# Patient Record
Sex: MALE | Race: BLACK OR AFRICAN AMERICAN | NOT HISPANIC OR LATINO | Employment: FULL TIME | URBAN - NONMETROPOLITAN AREA
[De-identification: names, ages, dates, MRNs, and addresses within clinical notes are randomized per-mention and may not be internally consistent; named-entity substitution may affect disease eponyms.]

---
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
## 2018-03-11 NOTE — ED PROVIDER NOTES
History  Chief Complaint   Patient presents with    Shortness of Breath     short of breathe  for 2days-history of asthma     HPI     21 yoM with mild asthma left his inhaler in another town while he's visiting family  SOb for two days  Never intubated  Never admitted  No steroid inhalers needed  Weekly nighttime awakening  No cp or fevers  Usually seasonal change sets hiim off  MDM:  Imp: asthma exac, he warrants albuterol and safe DC  Prior to Admission Medications   Prescriptions Last Dose Informant Patient Reported? Taking? albuterol (PROVENTIL HFA,VENTOLIN HFA) 90 mcg/act inhaler   Yes Yes   Sig: Inhale 2 puffs every 6 (six) hours as needed for wheezing      Facility-Administered Medications: None       Past Medical History:   Diagnosis Date    Asthma        History reviewed  No pertinent surgical history  History reviewed  No pertinent family history  I have reviewed and agree with the history as documented  Social History   Substance Use Topics    Smoking status: Never Smoker    Smokeless tobacco: Never Used    Alcohol use No        Review of Systems   Constitutional: Negative  Negative for appetite change, diaphoresis, fatigue and fever  HENT: Negative for congestion, dental problem, drooling, ear discharge, ear pain, postnasal drip, rhinorrhea, sore throat, trouble swallowing and voice change  Eyes: Negative for photophobia, pain, discharge, redness and visual disturbance  Respiratory: Positive for cough and shortness of breath  Negative for choking, chest tightness, wheezing and stridor  Cardiovascular: Negative for chest pain, palpitations and leg swelling  Gastrointestinal: Negative for abdominal pain, blood in stool, constipation, diarrhea, nausea and vomiting  Endocrine: Negative  Genitourinary: Negative  Musculoskeletal: Negative  Skin: Negative  Allergic/Immunologic: Negative  Neurological: Negative  Hematological: Negative  Psychiatric/Behavioral: Negative  Physical Exam  ED Triage Vitals [11/12/17 1317]   Temperature Pulse Respirations Blood Pressure SpO2   97 5 °F (36 4 °C) 102 22 142/87 96 %      Temp Source Heart Rate Source Patient Position - Orthostatic VS BP Location FiO2 (%)   Temporal -- -- Left arm --      Pain Score       7           Orthostatic Vital Signs  Vitals:    11/12/17 1317   BP: 142/87   Pulse: 102       Physical Exam   Constitutional: He is oriented to person, place, and time  He appears well-developed and well-nourished  No distress  HENT:   Head: Normocephalic and atraumatic  Nose: Nose normal    Mouth/Throat: Oropharynx is clear and moist    Eyes: Conjunctivae and EOM are normal  Pupils are equal, round, and reactive to light  Neck: Normal range of motion  Neck supple  No thyromegaly present  Cardiovascular: Normal rate, regular rhythm, normal heart sounds and intact distal pulses  Exam reveals no gallop and no friction rub  No murmur heard  Pulmonary/Chest: Effort normal  No stridor  No respiratory distress  He has wheezes (mild)  He has no rales  He exhibits no tenderness  Abdominal: Soft  Bowel sounds are normal  There is no tenderness  There is no guarding  Musculoskeletal: Normal range of motion  He exhibits no deformity  Neurological: He is alert and oriented to person, place, and time  He exhibits normal muscle tone  Skin: Skin is warm and dry  Psychiatric: He has a normal mood and affect  Vitals reviewed  ED Medications  Medications   albuterol (PROVENTIL HFA,VENTOLIN HFA) inhaler 2 puff (2 puffs Inhalation Given 11/12/17 1325)       Diagnostic Studies  Results Reviewed     None                 No orders to display              Procedures  Procedures       Phone Contacts  ED Phone Contact    ED Course  ED Course as of Nov 13 0729   Sun Nov 12, 2017   1337 Improved BS after inhaler used                                  [facility]  CritCare Time    Disposition  Final diagnoses:   Asthma exacerbation     Time reflects when diagnosis was documented in both MDM as applicable and the Disposition within this note     Time User Action Codes Description Comment    11/12/2017  1:22 PM Dhruv Garcia Add [J45 [id number]] Asthma exacerbation       ED Disposition     ED Disposition Condition Comment    Discharge  Kellie Alt discharge to home/self care  Condition at discharge: Good        Follow-up Information     Follow up With Specialties Details Why Contact Info    your primary care physician  Schedule an appointment as soon as possible for a visit for re-evaluation         Discharge Medication List as of 11/12/2017  1:22 PM      CONTINUE these medications which have NOT CHANGED    Details   albuterol (PROVENTIL HFA,VENTOLIN HFA) 90 mcg/act inhaler Inhale 2 puffs every 6 (six) hours as needed for wheezing, Historical Med           No discharge procedures on file      ED Provider  Electronically Signed by           Dhruv Mcleod DO  11/13/17 4319 ambulatory

## 2018-07-27 ENCOUNTER — APPOINTMENT (EMERGENCY)
Dept: RADIOLOGY | Facility: HOSPITAL | Age: 18
End: 2018-07-27
Payer: COMMERCIAL

## 2018-07-27 ENCOUNTER — HOSPITAL ENCOUNTER (EMERGENCY)
Facility: HOSPITAL | Age: 18
Discharge: HOME | End: 2018-07-27
Attending: EMERGENCY MEDICINE | Admitting: EMERGENCY MEDICINE
Payer: COMMERCIAL

## 2018-07-27 VITALS
SYSTOLIC BLOOD PRESSURE: 121 MMHG | WEIGHT: 126.32 LBS | BODY MASS INDEX: 18.65 KG/M2 | RESPIRATION RATE: 18 BRPM | TEMPERATURE: 98.7 F | HEART RATE: 58 BPM | OXYGEN SATURATION: 100 % | DIASTOLIC BLOOD PRESSURE: 58 MMHG

## 2018-07-27 DIAGNOSIS — S83.91XA RIGHT KNEE SPRAIN: Primary | ICD-10-CM

## 2018-07-27 PROCEDURE — 73564 X-RAY EXAM KNEE 4 OR MORE: CPT

## 2018-07-27 PROCEDURE — 99283 EMERGENCY DEPT VISIT LOW MDM: CPT

## 2018-07-27 RX ORDER — NAPROXEN 250 MG/1
500 TABLET ORAL ONCE
Status: DISCONTINUED | OUTPATIENT
Start: 2018-07-27 | End: 2018-07-27

## 2018-07-27 RX ORDER — NAPROXEN 500 MG/1
500 TABLET ORAL 2 TIMES DAILY WITH MEALS
Qty: 14 TABLET | Refills: 0 | Status: SHIPPED | OUTPATIENT
Start: 2018-07-27 | End: 2018-08-03

## 2018-07-27 RX ORDER — IBUPROFEN 400 MG/1
400 TABLET ORAL ONCE
Status: COMPLETED | OUTPATIENT
Start: 2018-07-27 | End: 2018-07-27

## 2018-07-27 RX ADMIN — IBUPROFEN 400 MG: 400 TABLET, FILM COATED ORAL at 14:44

## 2018-07-27 NOTE — DISCHARGE INSTRUCTIONS
Knee Sprain   WHAT YOU NEED TO KNOW:   A knee sprain occurs when one or more ligaments in your knee are suddenly stretched or torn  Ligaments are tissues that hold bones together  Ligaments support the knee and keep the joint and bones in the correct position  DISCHARGE INSTRUCTIONS:   Return to the emergency department if:   · Any part of your leg feels cold, numb, or looks pale     Contact your healthcare provider if:   · You have new or increased swelling, bruising, or pain in your knee  · Your symptoms do not improve within 6 weeks, even with treatment  · You have questions or concerns about your condition or care  Medicines:   · NSAIDs , such as ibuprofen, help decrease swelling, pain, and fever  This medicine is available with or without a doctor's order  NSAIDs can cause stomach bleeding or kidney problems in certain people  If you take blood thinner medicine, always ask your healthcare provider if NSAIDs are safe for you  Always read the medicine label and follow directions  · Acetaminophen  decreases pain and fever  It is available without a doctor's order  Ask how much to take and how often to take it  Follow directions  Read the labels of all other medicines you are using to see if they also contain acetaminophen, or ask your doctor or pharmacist  Acetaminophen can cause liver damage if not taken correctly  Do not use more than 4 grams (4,000 milligrams) total of acetaminophen in one day  · Prescription pain medicine  may be given  Ask how to take this medicine safely  · Take your medicine as directed  Contact your healthcare provider if you think your medicine is not helping or if you have side effects  Tell him or her if you are allergic to any medicine  Keep a list of the medicines, vitamins, and herbs you take  Include the amounts, and when and why you take them  Bring the list or the pill bottles to follow-up visits   Carry your medicine list with you in case of an emergency  Self-care:   · Rest  your knee and do not exercise  You may be told to keep weight off your knee  This means that you should not walk on your injured leg  Rest helps decrease swelling and allows the injury to heal  You can do gentle range of motion (ROM) exercises as directed  This will prevent stiffness  · Apply ice  on your knee for 15 to 20 minutes every hour or as directed  Use an ice pack, or put crushed ice in a plastic bag  Cover it with a towel  Ice helps prevent tissue damage and decreases swelling and pain  · Apply compression to your knee as directed  You may need to wear an elastic bandage  This helps keep your injured knee from moving too much while it heals  You can loosen or tighten the elastic bandage to make it comfortable  It should be tight enough for you to feel support  It should not be so tight that it causes your toes to feel numb or tingly  If you are wearing an elastic bandage, take it off and rewrap it once a day  · Elevate your knee  above the level of your heart as often as you can  This will help decrease swelling and pain  Prop your leg on pillows or blankets to keep it elevated comfortably  Do not put pillows directly behind your knee  · Use support devices as directed:  Support devices such as a splint or brace may be needed  These devices limit movement and protect your joint while it heals  You may be given crutches to use until you can stand on your injured leg without pain  Use devices as directed  Physical therapy:  A physical therapist teaches you exercises to help improve movement and strength, and to decrease pain  Prevent another knee sprain:  Exercise your legs to keep your muscles strong  Strong leg muscles help protect your knee and prevent strain  The following may also prevent a knee sprain:  · Slowly start your exercise or training program   Slowly increase the time, distance, and intensity of your exercise   Sudden increases in training may cause you to injure your knee again  · Wear protective braces and equipment as directed  Braces may prevent your knee from moving the wrong way and causing another sprain  Protective equipment may support your bones and ligaments to prevent injury  · Warm up and stretch before exercise  Warm up by walking or using an exercise bike before starting your regular exercise  Do gentle stretches after warming up  This helps to loosen your muscles and decrease stress on your knee  Cool down and stretch after you exercise  · Wear shoes that fit correctly and support your feet  Replace your running or exercise shoes before the padding or shock absorption is worn out  Ask your healthcare provider which exercise shoes are best for you  Ask if you should wear special shoe inserts  Shoe inserts can help support your heels and arches or keep your foot lined up correctly in your shoes  Exercise on flat surfaces  Follow up with your healthcare provider as directed:  Write down your questions so you remember to ask them during your visits  © 2017 [facility] Information is for End User's use only and may not be sold, redistributed or otherwise used for commercial purposes  All illustrations and images included in CareNotes® are the copyrighted property of [employer]  or [employer]  The above information is an  only  It is not intended as medical advice for individual conditions or treatments  Talk to your doctor, nurse or pharmacist before following any medical regimen to see if it is safe and effective for you

## 2018-07-28 NOTE — ED PROVIDER NOTES
History  Chief Complaint   Patient presents with    Knee Injury     pt was playing basketball when he fell onto his right knee  History provided by:  Patient  Knee Pain   Location:  Knee  Time since incident:  1 week  Injury: yes    Mechanism of injury: fall    Fall:     Fall occurred:  Recreating/playing (fell playing basketball)    Impact surface:  Athletic surface    Point of impact:  Knees    Entrapped after fall: no    Knee location:  R knee  Pain details:     Quality:  Unable to specify    Radiates to:  Does not radiate    Severity:  Moderate    Onset quality:  Sudden    Timing:  Constant    Progression:  Unchanged  Chronicity:  New  Dislocation: no    Foreign body present:  No foreign bodies  Tetanus status:  Up to date  Prior injury to area:  No  Relieved by:  Nothing  Worsened by:  Nothing  Ineffective treatments:  Ice and rest  Associated symptoms: no back pain, no decreased ROM, no fatigue, no fever, no muscle weakness, no neck pain, no numbness, no stiffness, no swelling (subjectively appears swollen "bone sticking out" on right lateral knee) and no tingling    Risk factors: no concern for non-accidental trauma, no frequent fractures, no known bone disorder, no obesity and no recent illness        Prior to Admission Medications   Prescriptions Last Dose Informant Patient Reported? Taking?    albuterol (PROVENTIL HFA,VENTOLIN HFA) 90 mcg/act inhaler   Yes No   Sig: Inhale 2 puffs every 6 (six) hours as needed for wheezing   albuterol (PROVENTIL HFA,VENTOLIN HFA) 90 mcg/act inhaler   No No   Sig: Inhale 2 puffs every 6 (six) hours as needed for wheezing   fluticasone-salmeterol (ADVAIR HFA) 115-21 MCG/ACT inhaler   No No   Sig: Inhale 2 puffs 2 (two) times a day   ipratropium-albuterol (DUO-NEB) 0 5-2 5 mg/mL   No No   Sig: Take 3 mL by nebulization every 6 (six) hours   predniSONE 20 mg tablet   No No   Sig: Take 3 tablets by mouth daily      Facility-Administered Medications: None       Past Medical History:   Diagnosis Date    Asthma        History reviewed  No pertinent surgical history  History reviewed  No pertinent family history  I have reviewed and agree with the history as documented  Social History   Substance Use Topics    Smoking status: Current Some Day Smoker    Smokeless tobacco: Never Used    Alcohol use No        Review of Systems   Constitutional: Negative for chills, fatigue and fever  HENT: Negative for congestion, ear pain and sore throat  Respiratory: Negative for cough and shortness of breath  Cardiovascular: Negative for chest pain and palpitations  Gastrointestinal: Negative for abdominal pain, diarrhea, nausea and vomiting  Musculoskeletal: Negative for arthralgias, back pain, neck pain and stiffness  Skin: Negative for rash and wound  Allergic/Immunologic: Negative for immunocompromised state  Neurological: Negative for dizziness, numbness and headaches  Physical Exam  Physical Exam   Constitutional: He is oriented to person, place, and time  He appears well-developed and well-nourished  HENT:   Head: Normocephalic and atraumatic  Mouth/Throat: Oropharynx is clear and moist    Eyes: Pupils are equal, round, and reactive to light  Neck: Neck supple  Cardiovascular: Normal rate, regular rhythm, normal heart sounds and intact distal pulses  Pulses:       Dorsalis pedis pulses are 2+ on the right side, and 2+ on the left side  Posterior tibial pulses are 2+ on the right side, and 2+ on the left side  Pulmonary/Chest: Effort normal and breath sounds normal    Musculoskeletal: Normal range of motion  He exhibits tenderness  He exhibits no edema or deformity          Right hip: Normal         Right knee: He exhibits normal range of motion, no swelling, no effusion, no ecchymosis, no deformity, no laceration, no erythema, normal alignment, no LCL laxity, normal patellar mobility, no bony tenderness, normal meniscus and no MCL laxity  Tenderness found  Lateral joint line and LCL tenderness noted  No medial joint line, no MCL and no patellar tendon tenderness noted  Left knee: He exhibits normal range of motion, no swelling, no effusion, no ecchymosis, no deformity, no laceration, no erythema, normal alignment, no LCL laxity, normal patellar mobility, no bony tenderness, normal meniscus and no MCL laxity  No tenderness found  No medial joint line, no lateral joint line, no MCL, no LCL and no patellar tendon tenderness noted  Right ankle: Normal         Left ankle: Normal         Legs:  Neurological: He is alert and oriented to person, place, and time  Skin: Skin is warm and dry  Capillary refill takes less than 2 seconds  Nursing note and vitals reviewed  Vital Signs  ED Triage Vitals [07/27/18 1440]   Temperature Pulse Respirations Blood Pressure SpO2   98 7 °F (37 1 °C) 58 18 121/58 100 %      Temp Source Heart Rate Source Patient Position - Orthostatic VS BP Location FiO2 (%)   Temporal Monitor Sitting Right arm --      Pain Score       Worst Possible Pain           Vitals:    07/27/18 1440   BP: 121/58   Pulse: 58   Patient Position - Orthostatic VS: Sitting       Visual Acuity      ED Medications  Medications   ibuprofen (MOTRIN) tablet 400 mg (400 mg Oral Given 7/27/18 1444)       Diagnostic Studies  Results Reviewed     None                 XR knee 4+ views RIGHT   Final Result by Sammy Hammond MD (07/27 1514)      Normal right knee  Workstation performed: [id number]                    Procedures  Procedures   Splint (knee immobilizer) applied by RN and/or ED Tech  CMS intact checked by me pre and post splint application        Phone Contacts  ED Phone Contact    ED Course                               MDM  Number of Diagnoses or Management Options  Right knee sprain: new and does not require workup     Amount and/or Complexity of Data Reviewed  Tests in the radiology section of CPT®: ordered and reviewed  Independent visualization of images, tracings, or specimens: yes    Risk of Complications, Morbidity, and/or Mortality  Presenting problems: low  Diagnostic procedures: low  Management options: low    Patient Progress  Patient progress: stable    CritCare Time    Disposition  Final diagnoses:   Right knee sprain     Time reflects when diagnosis was documented in both MDM as applicable and the Disposition within this note     Time User Action Codes Description Comment    7/27/2018  3:49 PM Chace Mejia Right knee sprain       ED Disposition     ED Disposition Condition Comment    Discharge  Lazarus Hurter discharge to home/self care  Condition at discharge: Good        Follow-up Information     Follow up With Specialties Details Why Contact Info    Allison Hodges MD Orthopedic Surgery Schedule an appointment as soon as possible for a visit Seen in ER need followup for injury, orthopedic followup 2018 [address], [place] [address]  [phone]            Discharge Medication List as of 7/27/2018  3:50 PM      START taking these medications    Details   naproxen (NAPROSYN) 500 mg tablet Take 1 tablet (500 mg total) by mouth 2 (two) times a day with meals for 7 days, Starting Fri 7/27/2018, Until Fri 8/3/2018, Print         CONTINUE these medications which have NOT CHANGED    Details   !! albuterol (PROVENTIL HFA,VENTOLIN HFA) 90 mcg/act inhaler Inhale 2 puffs every 6 (six) hours as needed for wheezing, Historical Med      !! albuterol (PROVENTIL HFA,VENTOLIN HFA) 90 mcg/act inhaler Inhale 2 puffs every 6 (six) hours as needed for wheezing, Starting Mon 11/27/2017, Print      fluticasone-salmeterol (ADVAIR HFA) 115-21 MCG/ACT inhaler Inhale 2 puffs 2 (two) times a day, Starting Mon 11/27/2017, Print      ipratropium-albuterol (DUO-NEB) 0 5-2 5 mg/mL Take 3 mL by nebulization every 6 (six) hours, Starting Mon 11/27/2017, Print       !! - Potential duplicate medications found   Please discuss with provider  STOP taking these medications       predniSONE 20 mg tablet Comments:   Reason for Stopping:             No discharge procedures on file      ED Provider  Electronically Signed by           Arjun Marks PA-C  07/27/18 5715

## 2019-06-28 ENCOUNTER — APPOINTMENT (EMERGENCY)
Dept: RADIOLOGY | Facility: HOSPITAL | Age: 19
End: 2019-06-28
Payer: COMMERCIAL

## 2019-06-28 ENCOUNTER — HOSPITAL ENCOUNTER (EMERGENCY)
Facility: HOSPITAL | Age: 19
Discharge: HOME | End: 2019-06-28
Attending: EMERGENCY MEDICINE | Admitting: EMERGENCY MEDICINE
Payer: COMMERCIAL

## 2019-06-28 VITALS
TEMPERATURE: 98.5 F | DIASTOLIC BLOOD PRESSURE: 59 MMHG | SYSTOLIC BLOOD PRESSURE: 111 MMHG | OXYGEN SATURATION: 98 % | WEIGHT: 126.32 LBS | HEART RATE: 63 BPM | RESPIRATION RATE: 18 BRPM | BODY MASS INDEX: 18.65 KG/M2

## 2019-06-28 DIAGNOSIS — M71.30: ICD-10-CM

## 2019-06-28 DIAGNOSIS — M25.571 RIGHT ANKLE PAIN: Primary | ICD-10-CM

## 2019-06-28 PROCEDURE — 73590 X-RAY EXAM OF LOWER LEG: CPT

## 2019-06-28 PROCEDURE — 73610 X-RAY EXAM OF ANKLE: CPT

## 2019-06-28 PROCEDURE — 99283 EMERGENCY DEPT VISIT LOW MDM: CPT | Performed by: EMERGENCY MEDICINE

## 2019-06-28 PROCEDURE — 99283 EMERGENCY DEPT VISIT LOW MDM: CPT

## 2019-06-28 PROCEDURE — 73564 X-RAY EXAM KNEE 4 OR MORE: CPT

## 2019-06-28 RX ORDER — NAPROXEN 375 MG/1
375 TABLET ORAL 2 TIMES DAILY WITH MEALS
Qty: 20 TABLET | Refills: 0 | Status: SHIPPED | OUTPATIENT
Start: 2019-06-28 | End: 2020-09-17

## 2019-06-28 RX ORDER — NAPROXEN 250 MG/1
375 TABLET ORAL ONCE
Status: COMPLETED | OUTPATIENT
Start: 2019-06-28 | End: 2019-06-28

## 2019-06-28 RX ADMIN — NAPROXEN 375 MG: 250 TABLET ORAL at 18:16

## 2019-06-28 NOTE — ED NOTES
Ave wrap applied to right knee, pt ambulated with air cast refused crutches     Arianna Alamo, FREDERICK  06/28/19 6923

## 2019-06-28 NOTE — ED PROVIDER NOTES
History  Chief Complaint   Patient presents with    Ankle Pain     Patient was playing basketball one hour ago and rolled ankle  Patient would like a note for work  History provided by:  Patient  Ankle Pain   Location:  Ankle  Time since incident:  30 minutes  Injury: yes    Mechanism of injury comment:  Patient was playing basketball and lost balance while moving with right ankle inverting sharply  Ankle location:  R ankle  Pain details:     Quality:  Aching    Radiates to:  Does not radiate    Severity:  Moderate    Onset quality:  Sudden    Duration: 30 minutes  Timing:  Constant    Progression:  Unchanged  Chronicity:  New (No history of fracture/surgery in either LE: has had multiple ankle sprains of both lower extremities previously without any long-term deficits in use)  Prior injury to area:  No  Relieved by:  Nothing  Worsened by:  Bearing weight, extension and flexion (Has been able to bear weight minimally on ankle since the injury)  Ineffective treatments:  None tried  Associated symptoms: decreased ROM (Pain with any attempted AROM at the ankle), stiffness and swelling    Associated symptoms: no fatigue, no fever, no numbness and no tingling    Associated symptoms comment:  Occurring simultaneously with the injury, the patient has noted an area of soft tissue prominence over the right lateral tibial plateau that is painful with direct palpation  It is less prominent with extension of the knee  He states there had been no swelling or pain in that area prior to the injury  No pain in the remainder of the fibula or remainder of knee  Risk factors comment:  Denies any other trauma or injury from this episode: no head injury/LOC      Prior to Admission Medications   Prescriptions Last Dose Informant Patient Reported? Taking?    albuterol (PROVENTIL HFA,VENTOLIN HFA) 90 mcg/act inhaler   Yes Yes   Sig: Inhale 2 puffs every 6 (six) hours as needed for wheezing   albuterol (PROVENTIL HFA,VENTOLIN HFA) 90 mcg/act inhaler   No No   Sig: Inhale 2 puffs every 6 (six) hours as needed for wheezing   fluticasone-salmeterol (ADVAIR HFA) 115-21 MCG/ACT inhaler   No Yes   Sig: Inhale 2 puffs 2 (two) times a day   ipratropium-albuterol (DUO-NEB) 0 5-2 5 mg/mL   No Yes   Sig: Take 3 mL by nebulization every 6 (six) hours   naproxen (NAPROSYN) 500 mg tablet   No No   Sig: Take 1 tablet (500 mg total) by mouth 2 (two) times a day with meals for 7 days      Facility-Administered Medications: None       Past Medical History:   Diagnosis Date    Asthma        History reviewed  No pertinent surgical history  History reviewed  No pertinent family history  I have reviewed and agree with the history as documented  Social History     Tobacco Use    Smoking status: Current Some Day Smoker     Types: E-Cigarettes    Smokeless tobacco: Never Used   Substance Use Topics    Alcohol use: No    Drug use: No        Review of Systems   Constitutional: Negative for chills, diaphoresis, fatigue and fever  Musculoskeletal: Positive for arthralgias, gait problem, joint swelling, myalgias and stiffness  Skin: Negative for color change, pallor, rash and wound  Neurological: Negative for weakness and numbness  Hematological: Negative for adenopathy  Does not bruise/bleed easily  Physical Exam  Physical Exam   Constitutional: He is oriented to person, place, and time  Vital signs are normal  He appears well-developed and well-nourished  He is active and cooperative  No distress  HENT:   Head: Normocephalic and atraumatic  Neck: Trachea normal and phonation normal    Cardiovascular: Normal rate, regular rhythm, intact distal pulses and normal pulses  Pulses:       Popliteal pulses are 2+ on the right side, and 2+ on the left side  Dorsalis pedis pulses are 2+ on the right side, and 2+ on the left side  Posterior tibial pulses are 2+ on the right side, and 2+ on the left side     Pulmonary/Chest: Effort normal  No respiratory distress  Musculoskeletal:        Right knee: He exhibits bony tenderness  He exhibits normal range of motion (Full AROM in flexion/extension), no swelling and no effusion  Tenderness found  Lateral joint line tenderness noted  No medial joint line, no MCL and no LCL tenderness noted  Left knee: Normal         Right ankle: He exhibits decreased range of motion  He exhibits no swelling and no ecchymosis  Tenderness  Lateral malleolus, AITFL and proximal fibula tenderness found  No posterior TFL and no head of 5th metatarsal tenderness found  Achilles tendon exhibits no pain, no defect and normal Park's test results  Left ankle: Normal         Right lower leg: Normal         Left lower leg: Normal         Legs:       Right foot: Normal         Left foot: Normal         Feet:    Right ankle:   Mild soft tissue swelling immediately inferior/anterior to the lateral malleolus with no palpable bony deformity and no overlying skin breakdown  TTP over lateral malleolus and immediately inferior/anterior in region of ATF ligament  No ttp of proximal 5th MT  Full AROM in flexion/extension/inversion/eversion at ankle  Strength 5/5 in LE bilaterally at knee/ankle/toes in flexion/extension  Anterior drawer test wnl bilaterally  Right knee: There is a soft tissue prominence approx 1 cm diameter over the lateral tibial plateau distinct from the fibular head; there is mild ttp of this region without crepitus/bony deformity  Anterior drawer/posterior drawer and varus/valgus stress tests wnl  Left knee: Anterior drawer/posterior drawer and varus/valgus stress tests wnl  Neurological: He is alert and oriented to person, place, and time  He has normal strength  No sensory deficit  GCS eye subscore is 4  GCS verbal subscore is 5  GCS motor subscore is 6  Sensation is intact to light touch throughout the L2-S2 distribution of bilateral lower extremity    Strength 5/5 bilateral lower extremity at hip/knee/ankle in all planes of motion  Skin: Skin is warm, dry and intact  Capillary refill takes less than 2 seconds  He is not diaphoretic  Nursing note and vitals reviewed  Vital Signs  ED Triage Vitals [06/28/19 1755]   Temperature Pulse Respirations Blood Pressure SpO2   98 5 °F (36 9 °C) 56 17 114/62 100 %      Temp Source Heart Rate Source Patient Position - Orthostatic VS BP Location FiO2 (%)   Temporal Monitor Sitting Right arm --      Pain Score       6           Vitals:    06/28/19 1755 06/28/19 1830   BP: 114/62 105/57   Pulse: 56 56   Patient Position - Orthostatic VS: Sitting Lying         Visual Acuity      ED Medications  Medications   naproxen (NAPROSYN) tablet 375 mg (375 mg Oral Given 6/28/19 1816)       Diagnostic Studies  Results Reviewed     None                 XR knee 4+ vw right injury   ED Interpretation by Shaheen Burgos DO (06/28 1851)   No evidence of fracture/dislocation  The soft tissue prominence noted on physical examination does not correspond with any bony structures  Final Result by Cindy Leroy MD (06/28 1918)      No acute osseous abnormality  Workstation performed: [id number]         XR tibia fibula 2 views RIGHT   ED Interpretation by Shaheen Burgos DO (06/28 1852)   No evidence of fracture/dislocation  Final Result by Cindy Leroy MD (06/28 1920)      No acute osseous abnormality  Workstation performed: [id number]         XR ankle 3+ views RIGHT   ED Interpretation by Shaheen Burgos DO (06/28 1852)   No evidence of fracture/dislocation    Joint spaces appear normal                  Procedures  Procedures       ED Course         MDM  Number of Diagnoses or Management Options  Cyst of bursa of right knee: new and does not require workup  Right ankle pain: new and does not require workup     Amount and/or Complexity of Data Reviewed  Tests in the radiology section of CPT®: ordered and reviewed  Decide to obtain previous medical records or to obtain history from someone other than the patient: yes  Review and summarize past medical records: yes  Independent visualization of images, tracings, or specimens: yes    Risk of Complications, Morbidity, and/or Mortality  Presenting problems: moderate  Diagnostic procedures: moderate  Management options: moderate  General comments: No radiographic evidence of acute abnormality and no evidence of neurovascular injury/compartment syndrome  Suspect there is a combination of 1) moderate ankle sprain and 2) bursal herniation/cyst of R knee  Apply R knee Ace wrap and air cast to R ankle; WBAT in RLE  NSAID for pain control  Short-term f/u with orthopedic surgery for further evaluation  Discussed the impression with patient and the plan of care; all questions answered prior to discharge  Patient expressed understanding and agreed to plan  Patient Progress  Patient progress: improved      Disposition  Final diagnoses:   Right ankle pain   Cyst of bursa of right knee     Time reflects when diagnosis was documented in both MDM as applicable and the Disposition within this note     Time User Action Codes Description Comment    6/28/2019  6:57 PM Jose Olmstead [M25 571] Right ankle pain     6/28/2019  6:58 PM Jose Rose Add [M71 30] Cyst of bursa     6/28/2019  6:58 PM Emili Perdomo 1521 [M71 30] Cyst of bursa of right knee       ED Disposition     ED Disposition Condition Date/Time Comment    Discharge Stable Fri Jun 28, 2019  6:57 PM Muriel Durand discharge to home/self care              Follow-up Information     Follow up With Specialties Details Why [address] [facility]  Call in 2 days For an appointment for further evaluation [address] [facility]  [phone]          Patient's Medications   Discharge Prescriptions    NAPROXEN (NAPROSYN) 375 MG TABLET    Take 1 tablet (375 mg total) by mouth 2 (two) times a day with meals       Start Date: 6/28/2019 End Date: --       Order Dose: 375 mg       Quantity: 20 tablet    Refills: 0     No discharge procedures on file      ED Provider  Electronically Signed by           Anastacio Piper DO  06/28/19 2015

## 2019-07-29 ENCOUNTER — HOSPITAL ENCOUNTER (EMERGENCY)
Facility: HOSPITAL | Age: 19
Discharge: HOME | End: 2019-07-29
Attending: EMERGENCY MEDICINE | Admitting: EMERGENCY MEDICINE
Payer: COMMERCIAL

## 2019-07-29 VITALS
DIASTOLIC BLOOD PRESSURE: 81 MMHG | RESPIRATION RATE: 18 BRPM | OXYGEN SATURATION: 100 % | HEIGHT: 70 IN | TEMPERATURE: 98.4 F | WEIGHT: 128.09 LBS | BODY MASS INDEX: 18.34 KG/M2 | HEART RATE: 74 BPM | SYSTOLIC BLOOD PRESSURE: 116 MMHG

## 2019-07-29 DIAGNOSIS — J45.901 ASTHMA EXACERBATION: Primary | ICD-10-CM

## 2019-07-29 PROCEDURE — 94640 AIRWAY INHALATION TREATMENT: CPT

## 2019-07-29 PROCEDURE — 99284 EMERGENCY DEPT VISIT MOD MDM: CPT

## 2019-07-29 PROCEDURE — 99283 EMERGENCY DEPT VISIT LOW MDM: CPT | Performed by: EMERGENCY MEDICINE

## 2019-07-29 RX ORDER — PREDNISONE 20 MG/1
60 TABLET ORAL ONCE
Status: COMPLETED | OUTPATIENT
Start: 2019-07-29 | End: 2019-07-29

## 2019-07-29 RX ORDER — ALBUTEROL SULFATE 2.5 MG/3ML
5 SOLUTION RESPIRATORY (INHALATION) ONCE
Status: COMPLETED | OUTPATIENT
Start: 2019-07-29 | End: 2019-07-29

## 2019-07-29 RX ORDER — LORATADINE 10 MG/1
10 TABLET ORAL DAILY
Qty: 30 TABLET | Refills: 0 | Status: SHIPPED | OUTPATIENT
Start: 2019-07-29 | End: 2020-09-17

## 2019-07-29 RX ORDER — PREDNISONE 20 MG/1
60 TABLET ORAL DAILY
Qty: 15 TABLET | Refills: 0 | Status: SHIPPED | OUTPATIENT
Start: 2019-07-29 | End: 2019-08-03

## 2019-07-29 RX ORDER — LORATADINE 10 MG/1
10 TABLET ORAL ONCE
Status: COMPLETED | OUTPATIENT
Start: 2019-07-29 | End: 2019-07-29

## 2019-07-29 RX ORDER — ALBUTEROL SULFATE 90 UG/1
2 AEROSOL, METERED RESPIRATORY (INHALATION) EVERY 4 HOURS PRN
Qty: 1 INHALER | Refills: 0 | Status: SHIPPED | OUTPATIENT
Start: 2019-07-29 | End: 2020-09-17

## 2019-07-29 RX ADMIN — PREDNISONE 60 MG: 20 TABLET ORAL at 11:43

## 2019-07-29 RX ADMIN — LORATADINE 10 MG: 10 TABLET ORAL at 11:43

## 2019-07-29 RX ADMIN — ALBUTEROL SULFATE 5 MG: 2.5 SOLUTION RESPIRATORY (INHALATION) at 11:43

## 2019-07-29 RX ADMIN — IPRATROPIUM BROMIDE 0.5 MG: 0.5 SOLUTION RESPIRATORY (INHALATION) at 11:43

## 2019-07-29 NOTE — ED PROVIDER NOTES
History  Chief Complaint   Patient presents with    Shortness of Breath     worse last night  ran out of all his meds     Patient is a 57-year-old male who presents today with a chief complaint of asthma exacerbation  Patient reports that last evening he had an asthma exacerbation which he reports he felt was brought on by seasonal allergies  Patient reports he does not take any medications for seasonal allergies  Patient reports he does not believe he has ever been intubated for his asthma however reports he was previously admitted to the hospital for asthma with the last admission being in 2016  Patient reports no fevers or chills, nasal congestion associated with his asthma exacerbation  Patient reports he did not take any medications as he has ran out of his medications and does not follow with a family care provider on a regular basis  Patient reports he was concerned to go to work today as he works in a hot environment making windmills  History provided by:  Patient   used: No    Wheezing   Severity:  Mild  Severity compared to prior episodes:  Similar  Onset quality:  Gradual  Duration:  1 day  Timing:  Constant  Progression:  Partially resolved  Chronicity:  Chronic  Context comment:  Seasonal changes  Relieved by:  Beta-agonist inhaler  Worsened by:  Nothing  Ineffective treatments:  None tried  Associated symptoms: cough ( dry) and shortness of breath ( mild, resolved )    Associated symptoms: no chest pain, no chest tightness, no ear pain, no fatigue, no fever, no rash, no rhinorrhea and no sore throat        Prior to Admission Medications   Prescriptions Last Dose Informant Patient Reported? Taking?    albuterol (PROVENTIL HFA,VENTOLIN HFA) 90 mcg/act inhaler Not Taking at Unknown time  Yes No   Sig: Inhale 2 puffs every 6 (six) hours as needed for wheezing   albuterol (PROVENTIL HFA,VENTOLIN HFA) 90 mcg/act inhaler Not Taking at Unknown time  No No   Sig: Inhale 2 puffs every 6 (six) hours as needed for wheezing   Patient not taking: Reported on 7/29/2019   fluticasone-salmeterol (ADVAIR HFA) 115-21 MCG/ACT inhaler Past Month at Unknown time  No Yes   Sig: Inhale 2 puffs 2 (two) times a day   ipratropium-albuterol (DUO-NEB) 0 5-2 5 mg/mL Not Taking at Unknown time  No No   Sig: Take 3 mL by nebulization every 6 (six) hours   Patient not taking: Reported on 7/29/2019   naproxen (NAPROSYN) 375 mg tablet Not Taking at Unknown time  No No   Sig: Take 1 tablet (375 mg total) by mouth 2 (two) times a day with meals   Patient not taking: Reported on 7/29/2019   naproxen (NAPROSYN) 500 mg tablet   No No   Sig: Take 1 tablet (500 mg total) by mouth 2 (two) times a day with meals for 7 days      Facility-Administered Medications: None       Past Medical History:   Diagnosis Date    Asthma     Scoliosis        History reviewed  No pertinent surgical history  History reviewed  No pertinent family history  I have reviewed and agree with the history as documented  Social History     Tobacco Use    Smoking status: Current Some Day Smoker     Types: E-Cigarettes    Smokeless tobacco: Never Used   Substance Use Topics    Alcohol use: No    Drug use: Yes     Types: Marijuana        Review of Systems   Constitutional: Negative for chills, fatigue and fever  HENT: Negative for congestion, ear pain, rhinorrhea and sore throat  Eyes: Negative for redness  Respiratory: Positive for cough ( dry), shortness of breath ( mild, resolved ) and wheezing  Negative for chest tightness  Cardiovascular: Negative for chest pain and palpitations  Gastrointestinal: Negative for abdominal pain, nausea and vomiting  Genitourinary: Negative for dysuria and hematuria  Musculoskeletal: Negative  Skin: Negative for rash  Neurological: Negative for dizziness, syncope, light-headedness and numbness         Physical Exam  Physical Exam   Constitutional: He is oriented to person, place, and time  He appears well-developed and well-nourished  HENT:   Head: Normocephalic and atraumatic  Eyes: Pupils are equal, round, and reactive to light  Neck: Normal range of motion  Cardiovascular: Normal rate, regular rhythm and normal heart sounds  Pulmonary/Chest: Effort normal  He has wheezes ( minimal expiratory, none auscultated after albuterol) in the right upper field  Abdominal: Soft  There is no tenderness  There is no guarding  Lymphadenopathy:     He has no cervical adenopathy  Neurological: He is alert and oriented to person, place, and time  Skin: Skin is warm and dry  Capillary refill takes less than 2 seconds  Psychiatric: He has a normal mood and affect  Nursing note and vitals reviewed  Vital Signs  ED Triage Vitals [07/29/19 1123]   Temperature Pulse Respirations Blood Pressure SpO2   98 4 °F (36 9 °C) 71 (!) 26 116/81 99 %      Temp src Heart Rate Source Patient Position - Orthostatic VS BP Location FiO2 (%)   -- -- -- -- --      Pain Score       7           Vitals:    07/29/19 1123 07/29/19 1131 07/29/19 1231   BP: 116/81     Pulse: 71 69 74         Visual Acuity      ED Medications  Medications   albuterol inhalation solution 5 mg (5 mg Nebulization Given 7/29/19 1143)   ipratropium (ATROVENT) 0 02 % inhalation solution 0 5 mg (0 5 mg Nebulization Given 7/29/19 1143)   loratadine (CLARITIN) tablet 10 mg (10 mg Oral Given 7/29/19 1143)   predniSONE tablet 60 mg (60 mg Oral Given 7/29/19 1143)       Diagnostic Studies  Results Reviewed     None                 No orders to display              Procedures  Procedures       ED Course  ED Course as of Jul 29 1231   Mon Jul 29, 2019   1226 Patient reports complete resolution in symptoms  No wheezing was auscultated on lung exam at this time  Patient was reexamined at this time and was found to be stable for discharge    Return to emergency department criteria was reviewed with the patient who verbalized understanding and was agreeable to discharge and the treatment plan at this time  MDM    Disposition  Final diagnoses:   Asthma exacerbation     Time reflects when diagnosis was documented in both MDM as applicable and the Disposition within this note     Time User Action Codes Description Comment    7/29/2019 12:28 PM Jennifer Aguilar Asthma exacerbation       ED Disposition     ED Disposition Condition Date/Time Comment    Discharge Good Mon Jul 29, 2019 12:27 PM Michelle Mills discharge to home/self care  Follow-up Information     Follow up With Specialties Details Why Contact Info    Angelica Marquez DO Family Medicine Schedule an appointment as soon as possible for a visit in 2 days  [address] [facility] [phone]            Patient's Medications   Discharge Prescriptions    ALBUTEROL (5 MG/ML) 0 5 % NEBULIZER SOLUTION    Take 0 5 mL (2 5 mg total) by nebulization every 6 (six) hours as needed for wheezing       Start Date: 7/29/2019 End Date: --       Order Dose: 2 5 mg       Quantity: 20 mL    Refills: 0    ALBUTEROL (PROVENTIL HFA,VENTOLIN HFA) 90 MCG/ACT INHALER    Inhale 2 puffs every 4 (four) hours as needed for wheezing       Start Date: 7/29/2019 End Date: --       Order Dose: 2 puffs       Quantity: 1 Inhaler    Refills: 0    LORATADINE (CLARITIN) 10 MG TABLET    Take 1 tablet (10 mg total) by mouth daily       Start Date: 7/29/2019 End Date: --       Order Dose: 10 mg       Quantity: 30 tablet    Refills: 0    PREDNISONE 20 MG TABLET    Take 3 tablets (60 mg total) by mouth daily for 5 days       Start Date: 7/29/2019 End Date: 8/3/2019       Order Dose: 60 mg       Quantity: 15 tablet    Refills: 0     No discharge procedures on file      ED Provider  Electronically Signed by           Derek Kendrick PA-C  07/29/19 8417

## 2019-07-29 NOTE — ED NOTES
Pt states he has ran out of the inhaler that worked  Pt states advair is not working for him and he wanted to get checked due to working in a [employer]      Pt has no other complaints at this time     Jeffy Phan RN  07/29/19 8215

## 2019-11-05 ENCOUNTER — HOSPITAL ENCOUNTER (EMERGENCY)
Facility: HOSPITAL | Age: 19
Discharge: HOME | End: 2019-11-05
Attending: EMERGENCY MEDICINE

## 2019-11-05 VITALS
TEMPERATURE: 98.9 F | WEIGHT: 122.58 LBS | DIASTOLIC BLOOD PRESSURE: 54 MMHG | RESPIRATION RATE: 20 BRPM | HEART RATE: 93 BPM | OXYGEN SATURATION: 97 % | SYSTOLIC BLOOD PRESSURE: 123 MMHG | BODY MASS INDEX: 17.59 KG/M2

## 2019-11-05 DIAGNOSIS — J45.909 ASTHMA: ICD-10-CM

## 2019-11-05 DIAGNOSIS — J06.9 URI (UPPER RESPIRATORY INFECTION): Primary | ICD-10-CM

## 2019-11-05 PROCEDURE — 99283 EMERGENCY DEPT VISIT LOW MDM: CPT

## 2019-11-05 PROCEDURE — 99284 EMERGENCY DEPT VISIT MOD MDM: CPT | Performed by: PHYSICIAN ASSISTANT

## 2019-11-05 RX ORDER — GUAIFENESIN/DEXTROMETHORPHAN 100-10MG/5
5 SYRUP ORAL 3 TIMES DAILY PRN
Qty: 118 ML | Refills: 0 | Status: SHIPPED | OUTPATIENT
Start: 2019-11-05 | End: 2019-11-15

## 2019-11-05 RX ORDER — ALBUTEROL SULFATE 90 UG/1
2 AEROSOL, METERED RESPIRATORY (INHALATION) EVERY 4 HOURS PRN
Qty: 1 INHALER | Refills: 0 | Status: SHIPPED | OUTPATIENT
Start: 2019-11-05 | End: 2020-09-17 | Stop reason: SDUPTHER

## 2019-11-05 NOTE — ED PROVIDER NOTES
History  Chief Complaint   Patient presents with    Asthma     Asthma exacerbation for 2 days  has been using inhaler and neb with little relief     Patient is a 78-year-old male who presents today with a chief complaint of nasal congestion, sore throat and asthma exacerbation which has been ongoing for the past 2 days  Patient reports he has a history of asthma for which he has never been intubated or admitted to the ICU  Patient reports he uses an inhaler and a nebulizer however notes his nasal congestion and sore throat continue  Patient denies any chest pain, abdominal pain, nausea, vomiting, diarrhea  Patient does endorse a decreased appetite due to feeling ill and notes he has been taking Tylenol with moderate relief for symptoms  Patient denies any headaches, lightheadedness, dizziness, neck stiffness, rashes associated with his symptoms  History provided by:  Patient  URI   Presenting symptoms: congestion, cough ( dry), fatigue, rhinorrhea and sore throat    Presenting symptoms: no ear pain and no fever    Severity:  Moderate  Onset quality:  Gradual  Duration:  2 days  Timing:  Constant  Progression:  Unchanged  Chronicity:  Recurrent  Relieved by:  Nothing  Worsened by:  Nothing  Ineffective treatments:  Nebulizer treatments  Associated symptoms: wheezing        Prior to Admission Medications   Prescriptions Last Dose Informant Patient Reported? Taking?    albuterol (5 mg/mL) 0 5 % nebulizer solution   No No   Sig: Take 0 5 mL (2 5 mg total) by nebulization every 6 (six) hours as needed for wheezing   albuterol (PROVENTIL HFA,VENTOLIN HFA) 90 mcg/act inhaler   Yes No   Sig: Inhale 2 puffs every 6 (six) hours as needed for wheezing   albuterol (PROVENTIL HFA,VENTOLIN HFA) 90 mcg/act inhaler   No No   Sig: Inhale 2 puffs every 6 (six) hours as needed for wheezing   Patient not taking: Reported on 7/29/2019   albuterol (PROVENTIL HFA,VENTOLIN HFA) 90 mcg/act inhaler   No No   Sig: Inhale 2 puffs every 4 (four) hours as needed for wheezing   fluticasone-salmeterol (ADVAIR HFA) 115-21 MCG/ACT inhaler   No No   Sig: Inhale 2 puffs 2 (two) times a day   ipratropium-albuterol (DUO-NEB) 0 5-2 5 mg/mL   No No   Sig: Take 3 mL by nebulization every 6 (six) hours   Patient not taking: Reported on 7/29/2019   loratadine (CLARITIN) 10 mg tablet   No No   Sig: Take 1 tablet (10 mg total) by mouth daily   naproxen (NAPROSYN) 375 mg tablet   No No   Sig: Take 1 tablet (375 mg total) by mouth 2 (two) times a day with meals   Patient not taking: Reported on 7/29/2019      Facility-Administered Medications: None       Past Medical History:   Diagnosis Date    Asthma     Scoliosis        History reviewed  No pertinent surgical history  History reviewed  No pertinent family history  I have reviewed and agree with the history as documented  Social History     Tobacco Use    Smoking status: Current Every Day Smoker     Packs/day: 0 20     Types: Cigarettes    Smokeless tobacco: Never Used   Substance Use Topics    Alcohol use: No    Drug use: Not Currently     Types: Marijuana        Review of Systems   Constitutional: Positive for appetite change and fatigue  Negative for chills and fever  HENT: Positive for congestion, postnasal drip, rhinorrhea and sore throat  Negative for ear pain  Eyes: Negative for redness  Respiratory: Positive for cough ( dry) and wheezing  Negative for apnea, choking, chest tightness and shortness of breath  Cardiovascular: Negative for chest pain and palpitations  Gastrointestinal: Negative for abdominal pain, nausea and vomiting  Genitourinary: Negative for dysuria and hematuria  Musculoskeletal: Negative  Skin: Negative for rash  Neurological: Negative for dizziness, syncope, light-headedness and numbness  Physical Exam  Physical Exam   Constitutional: He is oriented to person, place, and time  He appears well-developed and well-nourished     HENT:   Head: Normocephalic and atraumatic  Right Ear: Tympanic membrane normal    Left Ear: Tympanic membrane normal    Mouth/Throat: No posterior oropharyngeal erythema  No tonsillar exudate  Eyes: Pupils are equal, round, and reactive to light  Neck: Normal range of motion  Cardiovascular: Normal rate, regular rhythm and normal heart sounds  Pulmonary/Chest: Effort normal and breath sounds normal    No respiratory distress, patient managing oral secretions without difficulty and speaking in full sentences  No wheezing noted on auscultation to all lung fields   Abdominal: Soft  There is no tenderness  There is no guarding  Lymphadenopathy:     He has no cervical adenopathy  Neurological: He is alert and oriented to person, place, and time  Skin: Skin is warm and dry  Capillary refill takes less than 2 seconds  Psychiatric: He has a normal mood and affect  Nursing note and vitals reviewed        Vital Signs  ED Triage Vitals   Temperature Pulse Respirations Blood Pressure SpO2   11/05/19 1549 11/05/19 1549 11/05/19 1549 11/05/19 1549 11/05/19 1550   98 9 °F (37 2 °C) 93 20 123/54 97 %      Temp Source Heart Rate Source Patient Position - Orthostatic VS BP Location FiO2 (%)   11/05/19 1549 11/05/19 1549 11/05/19 1549 11/05/19 1549 --   Tympanic Monitor Sitting Left arm       Pain Score       11/05/19 1549       7           Vitals:    11/05/19 1549   BP: 123/54   Pulse: 93   Patient Position - Orthostatic VS: Sitting         Visual Acuity      ED Medications  Medications - No data to display    Diagnostic Studies  Results Reviewed     None                 No orders to display              Procedures  Procedures       ED Course                               MDM    Disposition  Final diagnoses:   URI (upper respiratory infection)   Asthma     Time reflects when diagnosis was documented in both MDM as applicable and the Disposition within this note     Time User Action Codes Description Comment    11/5/2019  4:06 PM Winston Barrios Add [J06 9] URI (upper respiratory infection)     11/5/2019  4:06 PM [facility] [[id number]] Asthma       ED Disposition     ED Disposition Condition Date/Time Comment    Discharge Good [place] Nov 5, 2019  4:06 PM Garsia Door discharge to home/self care  Follow-up Information     Follow up With Specialties Details Why Contact Info    Iva Huff MD Family Medicine Schedule an appointment as soon as possible for a visit   [address] [place] [address]  [phone]            Patient's Medications   Discharge Prescriptions    ALBUTEROL (PROVENTIL HFA,VENTOLIN HFA) 90 MCG/ACT INHALER    Inhale 2 puffs every 4 (four) hours as needed for wheezing       Start Date: 11/5/2019 End Date: --       Order Dose: 2 puffs       Quantity: 1 Inhaler    Refills: 0    DEXTROMETHORPHAN-GUAIFENESIN (ROBITUSSIN DM)  MG/5 ML SYRUP    Take 5 mL by mouth 3 (three) times a day as needed for cough for up to 10 days       Start Date: 11/5/2019 End Date: 11/15/2019       Order Dose: 5 mL       Quantity: 118 mL    Refills: 0     No discharge procedures on file      ED Provider  Electronically Signed by           Raj Cobian PA-C  11/05/19 8455

## 2020-09-17 ENCOUNTER — OFFICE VISIT (OUTPATIENT)
Dept: FAMILY MEDICINE CLINIC | Facility: CLINIC | Age: 20
End: 2020-09-17
Payer: COMMERCIAL

## 2020-09-17 VITALS
SYSTOLIC BLOOD PRESSURE: 124 MMHG | WEIGHT: 123.6 LBS | TEMPERATURE: 97.9 F | HEIGHT: 68 IN | BODY MASS INDEX: 18.73 KG/M2 | RESPIRATION RATE: 14 BRPM | HEART RATE: 64 BPM | OXYGEN SATURATION: 99 % | DIASTOLIC BLOOD PRESSURE: 64 MMHG

## 2020-09-17 DIAGNOSIS — R53.83 FATIGUE, UNSPECIFIED TYPE: ICD-10-CM

## 2020-09-17 DIAGNOSIS — J45.909 ASTHMA: ICD-10-CM

## 2020-09-17 DIAGNOSIS — Z11.4 SCREENING FOR HUMAN IMMUNODEFICIENCY VIRUS: ICD-10-CM

## 2020-09-17 DIAGNOSIS — M17.31 POST-TRAUMATIC OSTEOARTHRITIS OF RIGHT KNEE: Primary | ICD-10-CM

## 2020-09-17 DIAGNOSIS — R06.02 SOB (SHORTNESS OF BREATH): ICD-10-CM

## 2020-09-17 DIAGNOSIS — J30.9 ALLERGIC RHINITIS, UNSPECIFIED SEASONALITY, UNSPECIFIED TRIGGER: ICD-10-CM

## 2020-09-17 DIAGNOSIS — R01.1 HEART MURMUR: ICD-10-CM

## 2020-09-17 DIAGNOSIS — Z11.59 NEED FOR HEPATITIS C SCREENING TEST: ICD-10-CM

## 2020-09-17 LAB — NON VENT ROOM AIR: 350 %

## 2020-09-17 PROCEDURE — 3725F SCREEN DEPRESSION PERFORMED: CPT | Performed by: INTERNAL MEDICINE

## 2020-09-17 PROCEDURE — 93000 ELECTROCARDIOGRAM COMPLETE: CPT | Performed by: INTERNAL MEDICINE

## 2020-09-17 PROCEDURE — 94150 VITAL CAPACITY TEST: CPT | Performed by: INTERNAL MEDICINE

## 2020-09-17 PROCEDURE — 1036F TOBACCO NON-USER: CPT | Performed by: INTERNAL MEDICINE

## 2020-09-17 PROCEDURE — 99204 OFFICE O/P NEW MOD 45 MIN: CPT | Performed by: INTERNAL MEDICINE

## 2020-09-17 RX ORDER — ALBUTEROL SULFATE 90 UG/1
2 AEROSOL, METERED RESPIRATORY (INHALATION) EVERY 4 HOURS PRN
Qty: 1 INHALER | Refills: 0 | Status: SHIPPED | OUTPATIENT
Start: 2020-09-17

## 2020-09-17 RX ORDER — ALBUTEROL SULFATE 90 UG/1
2 AEROSOL, METERED RESPIRATORY (INHALATION) EVERY 6 HOURS PRN
Qty: 1 INHALER | Refills: 2 | Status: CANCELLED | OUTPATIENT
Start: 2020-09-17

## 2020-09-17 NOTE — LETTER
September 17, 2020     Patient: Estela Smiley   YOB: 2000   Date of Visit: 9/17/2020       To Whom it May Concern:    Janiceerin Hannahma is under my professional care  He was seen in my office on 9/17/2020  He is to be off work today  If you have any questions or concerns, please don't hesitate to call           Sincerely,          Pa Martinez MD        CC: No Recipients

## 2020-09-18 NOTE — PROGRESS NOTES
Assessment/Plan:         Diagnoses and all orders for this visit:    Post-traumatic osteoarthritis of right knee; Moist Heat   Home p T  RTC in 1-2 mos  w;  -     XR knee 4+ vw right injury; Future  -     Medial  Knee Brace, on daytome, off nighttime    Screening for human immunodeficiency virus  -     HIV 1/2 Antigen/Antibody (4th Generation) w Reflex [facility]; Future    Asthma; stable on :  -     albuterol (PROVENTIL HFA,VENTOLIN HFA) 90 mcg/act inhaler; Inhale 2 puffs every 4 (four) hours as needed for wheezing  -     Complete PFT with post bronchodilator; Future  RTC in 1-2 mos  w;  -     IgE; Future  -     [facility] Allergy Panel, Adult; Future  -     Food Allergy Profile; Future    SOB (shortness of breath); as above  -     POCT ECG  -     POCT peak flow  -     XR chest pa & lateral; Future  -     Complete PFT with post bronchodilator; Future    Heart murmur; consider ECHO  -     XR chest pa & lateral; Future    Fatigue, unspecified type; RTC in 1-2 mos  w;  -     Comprehensive metabolic panel; Future  -     CBC and differential; Future  -     Magnesium; Future  -     Lipid panel; Future  -     TSH, 3rd generation; Future  -     T4, free; Future  -     Thyroid Antibodies Panel; Future  -     Vitamin B12; Future  -     Vitamin D 25 hydroxy; Future  -     UA (URINE) with reflex to Scope; Future    Allergic rhinitis, unspecified seasonality, unspecified trigger  -     IgE; Future  -     [facility] Allergy Panel, Adult; Future  -     Food Allergy Profile; Future    Need for hepatitis C screening test  -     Hepatitis C antibody; Future    Other orders  -     Cancel: albuterol (PROVENTIL HFA,VENTOLIN HFA) 90 mcg/act inhaler; Inhale 2 puffs every 6 (six) hours as needed for wheezing  -     Cancel: fluticasone-salmeterol (ADVAIR HFA) 115-21 MCG/ACT inhaler; Inhale 2 puffs 2 (two) times a day        Subjective:      Patient ID: Jeana Tamayo is a 23 y o  male      23 y O man is here first time in my office, he has few symptoms as per R O S , No recent Blood work, Med list reviewed  w pt in detail    The following portions of the patient's history were reviewed and updated as appropriate: allergies, current medications, past family history, past social history, past surgical history and problem list     Review of Systems   Constitutional: Negative for chills, fatigue and fever  HENT: Negative for congestion, facial swelling, sore throat, trouble swallowing and voice change  Eyes: Negative for pain, discharge and visual disturbance  Respiratory: Negative for cough, shortness of breath and wheezing  Cardiovascular: Negative for chest pain, palpitations and leg swelling  Gastrointestinal: Negative for abdominal pain, blood in stool, constipation, diarrhea and nausea  Endocrine: Negative for polydipsia, polyphagia and polyuria  Genitourinary: Negative for difficulty urinating, hematuria and urgency  Musculoskeletal: Negative for arthralgias and myalgias  Skin: Negative for rash  Neurological: Negative for dizziness, tremors, weakness and headaches  Hematological: Negative for adenopathy  Does not bruise/bleed easily  Psychiatric/Behavioral: Negative for dysphoric mood, sleep disturbance and suicidal ideas  Objective:      /64 (BP Location: Left arm, Patient Position: Sitting, Cuff Size: Standard)   Pulse 64   Temp 97 9 °F (36 6 °C) (Probe)   Resp 14   Ht 5' 7 5" (1 715 m)   Wt 56 1 kg (123 lb 9 6 oz)   SpO2 99%   BMI 19 07 kg/m²          Physical Exam  Constitutional:       General: He is not in acute distress  HENT:      Head: Normocephalic  Mouth/Throat:      Pharynx: No oropharyngeal exudate  Eyes:      General: No scleral icterus  Conjunctiva/sclera: Conjunctivae normal       Pupils: Pupils are equal, round, and reactive to light  Neck:      Musculoskeletal: Neck supple  Thyroid: No thyromegaly     Cardiovascular:      Rate and Rhythm: Normal rate and regular rhythm  Heart sounds: Normal heart sounds  No murmur  Pulmonary:      Effort: Pulmonary effort is normal  No respiratory distress  Breath sounds: Normal breath sounds  No wheezing or rales  Abdominal:      General: Bowel sounds are normal  There is no distension  Palpations: Abdomen is soft  Tenderness: There is no abdominal tenderness  There is no guarding or rebound  Musculoskeletal:         General: Tenderness present  Lymphadenopathy:      Cervical: No cervical adenopathy  Skin:     Coloration: Skin is not pale  Findings: No rash  Neurological:      Mental Status: He is alert and oriented to person, place, and time  Sensory: No sensory deficit  Motor: No weakness

## 2020-11-06 ENCOUNTER — APPOINTMENT (EMERGENCY)
Dept: RADIOLOGY | Facility: HOSPITAL | Age: 20
End: 2020-11-06
Payer: COMMERCIAL

## 2020-11-06 ENCOUNTER — HOSPITAL ENCOUNTER (EMERGENCY)
Facility: HOSPITAL | Age: 20
Discharge: HOME | End: 2020-11-06
Attending: EMERGENCY MEDICINE | Admitting: EMERGENCY MEDICINE
Payer: COMMERCIAL

## 2020-11-06 VITALS
SYSTOLIC BLOOD PRESSURE: 105 MMHG | WEIGHT: 125.66 LBS | HEART RATE: 75 BPM | OXYGEN SATURATION: 98 % | BODY MASS INDEX: 19.39 KG/M2 | RESPIRATION RATE: 18 BRPM | TEMPERATURE: 97.3 F | DIASTOLIC BLOOD PRESSURE: 43 MMHG

## 2020-11-06 DIAGNOSIS — J45.21 MILD INTERMITTENT ASTHMA WITH EXACERBATION: Primary | ICD-10-CM

## 2020-11-06 PROCEDURE — 94640 AIRWAY INHALATION TREATMENT: CPT

## 2020-11-06 PROCEDURE — 71045 X-RAY EXAM CHEST 1 VIEW: CPT

## 2020-11-06 PROCEDURE — 99283 EMERGENCY DEPT VISIT LOW MDM: CPT

## 2020-11-06 PROCEDURE — 99284 EMERGENCY DEPT VISIT MOD MDM: CPT | Performed by: EMERGENCY MEDICINE

## 2020-11-06 RX ORDER — ALBUTEROL SULFATE 2.5 MG/3ML
2.5 SOLUTION RESPIRATORY (INHALATION) EVERY 6 HOURS PRN
Qty: 75 ML | Refills: 0 | Status: SHIPPED | OUTPATIENT
Start: 2020-11-06

## 2020-11-06 RX ORDER — PREDNISONE 20 MG/1
40 TABLET ORAL DAILY
Qty: 10 TABLET | Refills: 0 | Status: SHIPPED | OUTPATIENT
Start: 2020-11-06 | End: 2020-11-11

## 2020-11-06 RX ORDER — ALBUTEROL SULFATE 2.5 MG/3ML
5 SOLUTION RESPIRATORY (INHALATION) ONCE
Status: COMPLETED | OUTPATIENT
Start: 2020-11-06 | End: 2020-11-06

## 2020-11-06 RX ORDER — PREDNISONE 20 MG/1
40 TABLET ORAL ONCE
Status: COMPLETED | OUTPATIENT
Start: 2020-11-06 | End: 2020-11-06

## 2020-11-06 RX ORDER — ALBUTEROL SULFATE 90 UG/1
1-2 AEROSOL, METERED RESPIRATORY (INHALATION) EVERY 6 HOURS PRN
Qty: 1 INHALER | Refills: 0 | Status: SHIPPED | OUTPATIENT
Start: 2020-11-06

## 2020-11-06 RX ADMIN — PREDNISONE 40 MG: 20 TABLET ORAL at 07:49

## 2020-11-06 RX ADMIN — ALBUTEROL SULFATE 5 MG: 2.5 SOLUTION RESPIRATORY (INHALATION) at 07:50

## 2020-12-14 NOTE — DISCHARGE INSTRUCTIONS
Asthma, Ambulatory Care   GENERAL INFORMATION:   Asthma  is a lung disease that makes breathing difficult  Chronic inflammation and reactions to triggers narrow the airways in your lungs  Asthma can become life-threatening if it is not managed  Common symptoms include the following:   · Coughing     · Wheezing     · Shortness of breath     · Chest tightness  Seek immediate care for the following symptoms:   · Severe shortness of breath    · Blue or gray lips or nails    · Skin around your neck and ribs pulls in with each breath    · Shortness of breath, even after you take your short-term medicine as directed     · Peak flow numbers in the red zone of your asthma action plan  Treatment for asthma  will depend on how severe it is  Medicine may decrease inflammation, open airways, and make it easier to breathe  Medicines may be inhaled, taken as a pill, or injected  Short-term medicines relieve your symptoms quickly  Long-term medicines are used to prevent future attacks  You may also need medicine to help control your allergies  Manage and prevent future asthma attacks:   · Follow your asthma action pan  This is a written plan that you and your healthcare provider create  It explains which medicine you need and when to change doses if necessary  It also explains how you can monitor symptoms and use a peak flow meter  The meter measures how well your lungs are working  · Manage other health conditions , such as allergies, acid reflux, and sleep apnea  · Identify and avoid triggers  These may include pets, dust mites, mold, and cockroaches  · Do not smoke and avoid others who smoke  If you smoke, it is never too late to quit  Ask your healthcare provider if you need help quitting  · Ask about a flu vaccine  The flu can make your asthma worse  You may need a yearly flu shot  Follow up with your healthcare provider as directed:   You will need to return to make sure your medicine is working and your symptoms are controlled  You may be referred to an asthma or allergy specialist  Omaira Arambula may be asked to keep a record of your peak flow values and bring it with you to your appointments  Write down your questions so you remember to ask them during your visits  CARE AGREEMENT:   You have the right to help plan your care  Learn about your health condition and how it may be treated  Discuss treatment options with your caregivers to decide what care you want to receive  You always have the right to refuse treatment  The above information is an  only  It is not intended as medical advice for individual conditions or treatments  Talk to your doctor, nurse or pharmacist before following any medical regimen to see if it is safe and effective for you  © 2014 [address] is for End User's use only and may not be sold, redistributed or otherwise used for commercial purposes  All illustrations and images included in CareNotes® are the copyrighted property of A D A M , Inc  or Drew Ortiz  Xelmichelaz Pregnancy And Lactation Text: This medication is Pregnancy Category D and is not considered safe during pregnancy.  The risk during breast feeding is also uncertain.

## 2021-02-17 ENCOUNTER — TELEPHONE (OUTPATIENT)
Dept: FAMILY MEDICINE CLINIC | Facility: CLINIC | Age: 21
End: 2021-02-17

## 2021-02-17 NOTE — TELEPHONE ENCOUNTER
Pt called stating that for the past week he has had a fever over 100 for the past week  Pt reports having covid exposure last week  Pt has no other symptoms   Pt wants covid test

## 2021-02-19 ENCOUNTER — OFFICE VISIT (OUTPATIENT)
Dept: URGENT CARE | Age: 21
End: 2021-02-19
Payer: COMMERCIAL

## 2021-02-19 VITALS
OXYGEN SATURATION: 99 % | RESPIRATION RATE: 17 BRPM | BODY MASS INDEX: 19.99 KG/M2 | HEART RATE: 61 BPM | HEIGHT: 69 IN | WEIGHT: 135 LBS | TEMPERATURE: 98.1 F

## 2021-02-19 DIAGNOSIS — B34.9 ACUTE VIRAL SYNDROME: Primary | ICD-10-CM

## 2021-02-19 PROCEDURE — G0382 LEV 3 HOSP TYPE B ED VISIT: HCPCS | Performed by: PHYSICIAN ASSISTANT

## 2021-02-19 PROCEDURE — 87635 SARS-COV-2 COVID-19 AMP PRB: CPT | Performed by: PHYSICIAN ASSISTANT

## 2021-02-19 PROCEDURE — 99203 OFFICE O/P NEW LOW 30 MIN: CPT | Performed by: PHYSICIAN ASSISTANT

## 2021-02-19 PROCEDURE — 99283 EMERGENCY DEPT VISIT LOW MDM: CPT | Performed by: PHYSICIAN ASSISTANT

## 2021-02-19 NOTE — PATIENT INSTRUCTIONS
Monitor your symptoms, if symptoms worsen report to the ED  Increase oral hydration  Get plenty of rest   Take Motrin and Tylenol to reduce any fever/pain  [address]    Your healthcare provider and/or public health staff have evaluated you and have determined that you do not need to remain in the hospital at this time  At this time you can be isolated at home where you will be monitored by staff from your local or state health department  You should carefully follow the prevention and isolation steps below until a healthcare provider or local or state health department says that you can return to your normal activities  Stay home except to get medical care    People who are mildly ill with COVID-19 are able to isolate at home during their illness  You should restrict activities outside your home, except for getting medical care  Do not go to work, school, or public areas  Avoid using public transportation, ride-sharing, or taxis  Separate yourself from other people and animals in your home    People: As much as possible, you should stay in a specific room and away from other people in your home  Also, you should use a separate bathroom, if available  Animals: You should restrict contact with pets and other animals while you are sick with COVID-19, just like you would around other people  Although there have not been reports of pets or other animals becoming sick with COVID-19, it is still recommended that people sick with COVID-19 limit contact with animals until more information is known about the virus  When possible, have another member of your household care for your animals while you are sick  If you are sick with COVID-19, avoid contact with your pet, including petting, snuggling, being kissed or licked, and sharing food  If you must care for your pet or be around animals while you are sick, wash your hands before and after you interact with pets and wear a facemask   See COVID-19 and Animals for more information  Call ahead before visiting your doctor    If you have a medical appointment, call the healthcare provider and tell them that you have or may have COVID-19  This will help the healthcare providers office take steps to keep other people from getting infected or exposed  Wear a facemask    You should wear a facemask when you are around other people (e g , sharing a room or vehicle) or pets and before you enter a healthcare providers office  If you are not able to wear a facemask (for example, because it causes trouble breathing), then people who live with you should not stay in the same room with you, or they should wear a facemask if they enter your room  Cover your coughs and sneezes    Cover your mouth and nose with a tissue when you cough or sneeze  Throw used tissues in a lined trash can  Immediately wash your hands with soap and water for at least 20 seconds or, if soap and water are not available, clean your hands with an alcohol-based hand  that contains at least 60% alcohol  Clean your hands often    Wash your hands often with soap and water for at least 20 seconds, especially after blowing your nose, coughing, or sneezing; going to the bathroom; and before eating or preparing food  If soap and water are not readily available, use an alcohol-based hand  with at least 60% alcohol, covering all surfaces of your hands and rubbing them together until they feel dry  Soap and water are the best option if hands are visibly dirty  Avoid touching your eyes, nose, and mouth with unwashed hands  Avoid sharing personal household items    You should not share dishes, drinking glasses, cups, eating utensils, towels, or bedding with other people or pets in your home  After using these items, they should be washed thoroughly with soap and water      Clean all high-touch surfaces everyday    High touch surfaces include counters, tabletops, doorknobs, bathroom fixtures, toilets, phones, keyboards, tablets, and bedside tables  Also, clean any surfaces that may have blood, stool, or body fluids on them  Use a household cleaning spray or wipe, according to the label instructions  Labels contain instructions for safe and effective use of the cleaning product including precautions you should take when applying the product, such as wearing gloves and making sure you have good ventilation during use of the product  Monitor your symptoms    Seek prompt medical attention if your illness is worsening (e g , difficulty breathing)  Before seeking care, call your healthcare provider and tell them that you have, or are being evaluated for, COVID-19  Put on a facemask before you enter the facility  These steps will help the healthcare providers office to keep other people in the office or waiting room from getting infected or exposed  Ask your healthcare provider to call the local or [facility] health department  Persons who are placed under active monitoring or facilitated self-monitoring should follow instructions provided by their local health department or occupational health professionals, as appropriate  If you have a medical emergency and need to call 911, notify the dispatch personnel that you have, or are being evaluated for COVID-19  If possible, put on a facemask before emergency medical services arrive      Discontinuing home isolation    Patients with confirmed COVID-19 should remain under home isolation precautions until the following conditions are met:   - They have had no fever for at least 24 hours (that is one full day of no fever without the use medicine that reduces fevers)  AND  - other symptoms have improved (for example, when their cough or shortness of breath have improved)  AND  - If had mild or moderate illness, at least 10 days have passed since their symptoms first appeared or if severe illness (needed oxygen) or immunosuppressed, at least 20 days have passed since symptoms first appeared  Patients with confirmed COVID-19 should also notify close contacts (including their workplace) and ask that they self-quarantine  Currently, close contact is defined as being within 6 feet for 15 minutes or more from the period 24 hours starting 48 hours before symptom onset to the time at which the patient went into isolation  Close contacts of patients diagnosed with COVID-19 should be instructed by the patient to self-quarantine for 14 days from the last time of their last contact with the patient       Source: [employer]

## 2021-02-19 NOTE — LETTER
February 19, 2021     Patient: Kylah Amaro   YOB: 2000   Date of Visit: 2/19/2021       To Whom It May Concern: It is my medical opinion that Rafi Choudhury Should remain out of work for 10 days since symptom onset, fever free without the use of medications for 24 hours, and overall improvement of symptoms  OR 10 days since last high risk exposure OR negative test results  If you have any questions or concerns, please don't hesitate to call           Sincerely,        Myra Lora PA-C    CC: Kylah Amaro

## 2021-02-19 NOTE — PROGRESS NOTES
[facility]        NAME: Naldo Nicole is a 23 y o  male  : 2000    MRN: [id number]  DATE: 2021  TIME: 3:17 PM    Assessment and Plan   Acute viral syndrome [B34 9]  1  Acute viral syndrome  Novel Coronavirus (Covid-19),PCR [facility] - Office Collection         Patient Instructions       Follow up with PCP in 3-5 days  Proceed to  ER if symptoms worsen  Chief Complaint     Chief Complaint   Patient presents with    COVID-19     fever for 1wk,  cough, H/A, SOB  No meds/exposure         History of Present Illness       Patient is c/o fever, cough and weakness  Pt reports symptoms began 1 week ago  No N/V/D  Fever  This is a new problem  The current episode started in the past 7 days  The problem occurs intermittently  The problem has been waxing and waning  Associated symptoms include coughing, a fever and weakness  Pertinent negatives include no abdominal pain, arthralgias, chest pain, chills, rash, sore throat or vomiting  Review of Systems   Review of Systems   Constitutional: Positive for fever  Negative for chills  HENT: Positive for rhinorrhea  Negative for ear pain and sore throat  Eyes: Negative for pain and visual disturbance  Respiratory: Positive for cough  Negative for shortness of breath  Cardiovascular: Negative for chest pain and palpitations  Gastrointestinal: Negative for abdominal pain and vomiting  Genitourinary: Negative for dysuria and hematuria  Musculoskeletal: Negative for arthralgias and back pain  Skin: Negative for color change and rash  Neurological: Positive for weakness  Negative for seizures and syncope  All other systems reviewed and are negative          Current Medications       Current Outpatient Medications:     albuterol (2 5 mg/3 mL) 0 083 % nebulizer solution, Take 1 vial (2 5 mg total) by nebulization every 6 (six) hours as needed for wheezing or shortness of breath, Disp: 75 mL, Rfl: 0    albuterol (PROVENTIL HFA,VENTOLIN HFA) 90 mcg/act inhaler, Inhale 2 puffs every 4 (four) hours as needed for wheezing, Disp: 1 Inhaler, Rfl: 0    albuterol (PROVENTIL HFA,VENTOLIN HFA) 90 mcg/act inhaler, Inhale 1-2 puffs every 6 (six) hours as needed for wheezing, Disp: 1 Inhaler, Rfl: 0    Current Allergies     Allergies as of 02/19/2021 - Reviewed 02/19/2021   Allergen Reaction Noted    Other  11/27/2017            The following portions of the patient's history were reviewed and updated as appropriate: allergies, current medications, past family history, past medical history, past social history, past surgical history and problem list      Past Medical History:   Diagnosis Date    Asthma     Scoliosis        History reviewed  No pertinent surgical history  Family History   Problem Relation Age of Onset    No Known Problems Mother     No Known Problems Father          Medications have been verified  Objective   Pulse 61   Temp 98 1 °F (36 7 °C)   Resp 17   Ht 5' 9" (1 753 m)   Wt 61 2 kg (135 lb)   SpO2 99%   BMI 19 94 kg/m²   No LMP for male patient  Physical Exam     Physical Exam  Constitutional:       Appearance: Normal appearance  He is normal weight  HENT:      Head: Normocephalic and atraumatic  Nose: Nose normal       Mouth/Throat:      Mouth: Mucous membranes are moist    Eyes:      Extraocular Movements: Extraocular movements intact  Conjunctiva/sclera: Conjunctivae normal       Pupils: Pupils are equal, round, and reactive to light  Neck:      Musculoskeletal: Normal range of motion and neck supple  Cardiovascular:      Rate and Rhythm: Normal rate  Pulmonary:      Effort: Pulmonary effort is normal    Musculoskeletal: Normal range of motion  Skin:     General: Skin is warm and dry  Neurological:      General: No focal deficit present  Mental Status: He is alert and oriented to person, place, and time     Psychiatric:         Mood and Affect: Mood normal  Behavior: Behavior normal

## 2021-02-21 LAB — SARS-COV-2 RNA RESP QL NAA+PROBE: NEGATIVE

## 2023-02-01 ENCOUNTER — OFFICE VISIT (OUTPATIENT)
Dept: URGENT CARE | Facility: MEDICAL CENTER | Age: 23
End: 2023-02-01

## 2023-02-01 VITALS
BODY MASS INDEX: 18.62 KG/M2 | TEMPERATURE: 98.9 F | WEIGHT: 133 LBS | HEIGHT: 71 IN | OXYGEN SATURATION: 98 % | DIASTOLIC BLOOD PRESSURE: 62 MMHG | RESPIRATION RATE: 18 BRPM | SYSTOLIC BLOOD PRESSURE: 118 MMHG | HEART RATE: 60 BPM

## 2023-02-01 DIAGNOSIS — J45.901 ASTHMA WITH ACUTE EXACERBATION, UNSPECIFIED ASTHMA SEVERITY, UNSPECIFIED WHETHER PERSISTENT: Primary | ICD-10-CM

## 2023-02-01 RX ORDER — METHYLPREDNISOLONE 4 MG/1
TABLET ORAL
Qty: 1 EACH | Refills: 0 | Status: SHIPPED | OUTPATIENT
Start: 2023-02-01

## 2023-02-01 RX ORDER — ALBUTEROL SULFATE 90 UG/1
2 AEROSOL, METERED RESPIRATORY (INHALATION) EVERY 6 HOURS PRN
Qty: 8.5 G | Refills: 0 | Status: SHIPPED | OUTPATIENT
Start: 2023-02-01

## 2023-02-01 RX ORDER — LORATADINE 10 MG/1
10 TABLET ORAL DAILY
Qty: 30 TABLET | Refills: 0 | Status: SHIPPED | OUTPATIENT
Start: 2023-02-01

## 2023-02-14 NOTE — PROGRESS NOTES
[facility]        NAME: Leila Harrell is a 23 y o  male  : 2000    MRN: [id number]  DATE: 2023  TIME: 11:15 AM    Assessment and Plan   Asthma with acute exacerbation, unspecified asthma severity, unspecified whether persistent [J45 901]  1  Asthma with acute exacerbation, unspecified asthma severity, unspecified whether persistent  albuterol (ProAir HFA) 90 mcg/act inhaler    methylPREDNISolone 4 MG tablet therapy pack    loratadine (CLARITIN) 10 mg tablet            Patient Instructions       Follow up with PCP in 3-5 days  Proceed to  ER if symptoms worsen  Chief Complaint     Chief Complaint   Patient presents with   • Asthma     Had asthma attack this morning and needs new rescue inhaler          History of Present Illness       Patient is a 21 y/o/m [facility] s/p having an asthma attack  Pt reports having an asthma attack this morning and did not have rescue inhaler  Pt reports needing new rescue inhaler  Pt reports SOB has improved  Asthma  He complains of cough, shortness of breath and wheezing  This is a recurrent problem  The current episode started today  The problem occurs rarely  The problem has been resolved  Pertinent negatives include no chest pain, ear pain, fever or sore throat  His past medical history is significant for asthma  Review of Systems   Review of Systems   Constitutional: Negative for chills and fever  HENT: Negative for ear pain and sore throat  Eyes: Negative for pain and visual disturbance  Respiratory: Positive for cough, shortness of breath and wheezing  Cardiovascular: Negative for chest pain and palpitations  Gastrointestinal: Negative for abdominal pain and vomiting  Genitourinary: Negative for dysuria and hematuria  Musculoskeletal: Negative for arthralgias and back pain  Skin: Negative for color change and rash  Neurological: Negative for seizures and syncope     All other systems reviewed and are negative  Current Medications       Current Outpatient Medications:   •  albuterol (2 5 mg/3 mL) 0 083 % nebulizer solution, Take 1 vial (2 5 mg total) by nebulization every 6 (six) hours as needed for wheezing or shortness of breath, Disp: 75 mL, Rfl: 0  •  albuterol (ProAir HFA) 90 mcg/act inhaler, Inhale 2 puffs every 6 (six) hours as needed for wheezing, Disp: 8 5 g, Rfl: 0  •  albuterol (PROVENTIL HFA,VENTOLIN HFA) 90 mcg/act inhaler, Inhale 2 puffs every 4 (four) hours as needed for wheezing, Disp: 1 Inhaler, Rfl: 0  •  albuterol (PROVENTIL HFA,VENTOLIN HFA) 90 mcg/act inhaler, Inhale 1-2 puffs every 6 (six) hours as needed for wheezing, Disp: 1 Inhaler, Rfl: 0  •  loratadine (CLARITIN) 10 mg tablet, Take 1 tablet (10 mg total) by mouth daily, Disp: 30 tablet, Rfl: 0  •  methylPREDNISolone 4 MG tablet therapy pack, Use as directed on package, Disp: 1 each, Rfl: 0    Current Allergies     Allergies as of 02/01/2023 - Reviewed 02/01/2023   Allergen Reaction Noted   • Other  11/27/2017            The following portions of the patient's history were reviewed and updated as appropriate: allergies, current medications, past family history, past medical history, past social history, past surgical history and problem list      Past Medical History:   Diagnosis Date   • Asthma    • Scoliosis        History reviewed  No pertinent surgical history  Family History   Problem Relation Age of Onset   • No Known Problems Mother    • No Known Problems Father          Medications have been verified  Objective   /62   Pulse 60   Temp 98 9 °F (37 2 °C)   Resp 18   Ht 5' 11" (1 803 m)   Wt 60 3 kg (133 lb)   SpO2 98%   BMI 18 55 kg/m²   No LMP for male patient  Physical Exam     Physical Exam  Constitutional:       Appearance: Normal appearance  He is normal weight  HENT:      Head: Normocephalic and atraumatic        Nose: Nose normal       Mouth/Throat:      Mouth: Mucous membranes are moist    Eyes:      Extraocular Movements: Extraocular movements intact  Conjunctiva/sclera: Conjunctivae normal       Pupils: Pupils are equal, round, and reactive to light  Cardiovascular:      Rate and Rhythm: Normal rate and regular rhythm  Pulmonary:      Effort: Pulmonary effort is normal       Breath sounds: Wheezing present  Musculoskeletal:         General: Normal range of motion  Cervical back: Normal range of motion and neck supple  Skin:     General: Skin is warm and dry  Neurological:      General: No focal deficit present  Mental Status: He is alert and oriented to person, place, and time     Psychiatric:         Mood and Affect: Mood normal          Behavior: Behavior normal

## 2023-05-08 ENCOUNTER — TRANSITIONAL CARE MANAGEMENT (OUTPATIENT)
Dept: FAMILY MEDICINE CLINIC | Facility: CLINIC | Age: 23
End: 2023-05-08

## 2023-05-11 ENCOUNTER — OFFICE VISIT (OUTPATIENT)
Dept: FAMILY MEDICINE CLINIC | Facility: CLINIC | Age: 23
End: 2023-05-11

## 2023-05-11 VITALS
OXYGEN SATURATION: 97 % | HEIGHT: 71 IN | HEART RATE: 56 BPM | WEIGHT: 123.2 LBS | RESPIRATION RATE: 14 BRPM | TEMPERATURE: 97.8 F | DIASTOLIC BLOOD PRESSURE: 62 MMHG | SYSTOLIC BLOOD PRESSURE: 102 MMHG | BODY MASS INDEX: 17.25 KG/M2

## 2023-05-11 DIAGNOSIS — S06.0X9D: ICD-10-CM

## 2023-05-11 DIAGNOSIS — J45.901 ASTHMA WITH ACUTE EXACERBATION, UNSPECIFIED ASTHMA SEVERITY, UNSPECIFIED WHETHER PERSISTENT: ICD-10-CM

## 2023-05-11 DIAGNOSIS — J40 BRONCHITIS: ICD-10-CM

## 2023-05-11 DIAGNOSIS — J06.9 VIRAL URI WITH COUGH: ICD-10-CM

## 2023-05-11 DIAGNOSIS — H53.8 BLURRED VISION, LEFT EYE: ICD-10-CM

## 2023-05-11 DIAGNOSIS — J45.901 ASTHMA EXACERBATION: ICD-10-CM

## 2023-05-11 DIAGNOSIS — S47.2XXD: ICD-10-CM

## 2023-05-11 DIAGNOSIS — S02.2XXD CLOSED FRACTURE OF NASAL BONE WITH ROUTINE HEALING, SUBSEQUENT ENCOUNTER: ICD-10-CM
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
## 2023-05-11 DIAGNOSIS — Z00.01 ENCOUNTER FOR GENERAL ADULT MEDICAL EXAMINATION WITH ABNORMAL FINDINGS: Primary | ICD-10-CM

## 2023-05-11 DIAGNOSIS — S01.80XD OPEN WOUND OF FACE, SUBSEQUENT ENCOUNTER: ICD-10-CM

## 2023-05-11 RX ORDER — LORATADINE 10 MG/1
10 TABLET ORAL DAILY
Qty: 90 TABLET | Refills: 3 | Status: SHIPPED | OUTPATIENT
Start: 2023-05-11

## 2023-05-11 RX ORDER — ALBUTEROL SULFATE 90 UG/1
2 AEROSOL, METERED RESPIRATORY (INHALATION) EVERY 6 HOURS PRN
Qty: 8.5 G | Refills: 0 | Status: SHIPPED | OUTPATIENT
Start: 2023-05-11

## 2023-05-11 NOTE — PROGRESS NOTES
BMI Counseling: There is no height or weight on file to calculate BMI  The BMI is below normal  Dietary education for weight gain was provided to the patient today

## 2023-05-11 NOTE — PROGRESS NOTES
Name: Monserrat Stubbs      : 2000      MRN: [id number]  Encounter Provider: Camelia Redding MD  Encounter Date: 2023   Encounter department: [address] [place] [address]     1  Encounter for general adult medical examination with abnormal findings  -     Ferritin; Future  -     Comprehensive metabolic panel; Future  -     CBC and differential; Future  -     Magnesium; Future  -     Lipid panel; Future  -     TSH, 3rd generation; Future; Expected date: 2023  -     T4, free; Future; Expected date: 2023  -     Vitamin D 25 hydroxy; Future; Expected date: 2023  -     Vitamin B12; Future    2  Crushing injury of left shoulder, subsequent encounter  -     Ambulatory Referral to Orthopedic Surgery; Future  -     Ambulatory Referral to Otolaryngology; Future  -     Holter monitor; Future; Expected date: 2023  -     Echo complete w/ contrast if indicated; Future; Expected date: 2023  -     Ambulatory Referral to Comprehensive Concussion Program; Future  -     UA (URINE) with reflex to Scope; Future; Expected date: 2023  -     Toxicology screen, urine  -     Ambulatory Referral to Ophthalmology; Future; Expected date: 2023  -     Ambulatory Referral to Plastic Surgery; Future    3  Brain concussion, with loss of consciousness, subsequent encounter  -     Ambulatory Referral to Orthopedic Surgery; Future  -     Ambulatory Referral to Otolaryngology; Future  -     Holter monitor; Future; Expected date: 2023  -     Echo complete w/ contrast if indicated; Future; Expected date: 2023  -     Ambulatory Referral to Comprehensive Concussion Program; Future  -     UA (URINE) with reflex to Scope; Future; Expected date: 2023  -     Toxicology screen, urine  -     Ambulatory Referral to Ophthalmology; Future; Expected date: 2023  -     Ambulatory Referral to Plastic Surgery; Future    4   Closed fracture of nasal bone with routine healing, subsequent encounter  -     Ambulatory Referral to Orthopedic Surgery; Future  -     Ambulatory Referral to Otolaryngology; Future  -     Holter monitor; Future; Expected date: 05/11/2023  -     Echo complete w/ contrast if indicated; Future; Expected date: 05/11/2023  -     Ambulatory Referral to Comprehensive Concussion Program; Future  -     UA (URINE) with reflex to Scope; Future; Expected date: 05/18/2023  -     Toxicology screen, urine  -     Ambulatory Referral to Ophthalmology; Future; Expected date: 05/11/2023  -     Ambulatory Referral to Plastic Surgery; Future    5  Open wound of face, subsequent encounter  -     Ambulatory Referral to Ophthalmology; Future; Expected date: 05/11/2023  -     Ambulatory Referral to Plastic Surgery; Future    6  Blurred vision, left eye  -     Ambulatory Referral to Ophthalmology; Future; Expected date: 05/11/2023  -     Ambulatory Referral to Plastic Surgery; Future    7  Fall, subsequent encounter  -     Ambulatory Referral to Orthopedic Surgery; Future  -     Ambulatory Referral to Otolaryngology; Future  -     Holter monitor; Future; Expected date: 05/11/2023  -     Echo complete w/ contrast if indicated; Future; Expected date: 05/11/2023  -     Ambulatory Referral to Comprehensive Concussion Program; Future  -     UA (URINE) with reflex to Scope; Future; Expected date: 05/18/2023  -     Toxicology screen, urine  -     Ambulatory Referral to Ophthalmology; Future; Expected date: 05/11/2023  -     Ambulatory Referral to Plastic Surgery; Future    8  Asthma exacerbation    9  Viral URI with cough    10  Bronchitis    11  Asthma with acute exacerbation, unspecified asthma severity, unspecified whether persistent  -     loratadine (CLARITIN) 10 mg tablet; Take 1 tablet (10 mg total) by mouth daily  -     albuterol (ProAir HFA) 90 mcg/act inhaler;  Inhale 2 puffs every 6 (six) hours as needed for wheezing    bed rest  Increase po water/fluids  RTC in 1-2 Weeks  Annual Physical Exam : Done in detail    Subjective      23 Y O man is here for annual Physical exam and Post Hospital/TCM Visit, He had a fall at home, with Multiple injuries , He left Trauma dept at [facility], But still has muliple issues,  Review of Systems   Constitutional: Negative for chills, fatigue and fever  HENT: Positive for postnasal drip  Negative for congestion, facial swelling, sore throat, trouble swallowing and voice change  Eyes: Positive for visual disturbance  Negative for pain and discharge  Respiratory: Negative for cough, shortness of breath and wheezing  Cardiovascular: Negative for chest pain, palpitations and leg swelling  Gastrointestinal: Negative for abdominal pain, blood in stool, constipation, diarrhea and nausea  Endocrine: Negative for polydipsia, polyphagia and polyuria  Genitourinary: Negative for difficulty urinating, hematuria and urgency  Musculoskeletal: Positive for arthralgias, back pain, neck pain and neck stiffness  Negative for myalgias  Skin: Negative for rash  Neurological: Positive for dizziness and headaches  Negative for tremors and weakness  Hematological: Negative for adenopathy  Does not bruise/bleed easily  Psychiatric/Behavioral: Negative for dysphoric mood, sleep disturbance and suicidal ideas         Current Outpatient Medications on File Prior to Visit   Medication Sig   • [DISCONTINUED] albuterol (2 5 mg/3 mL) 0 083 % nebulizer solution Take 1 vial (2 5 mg total) by nebulization every 6 (six) hours as needed for wheezing or shortness of breath   • [DISCONTINUED] albuterol (PROVENTIL HFA,VENTOLIN HFA) 90 mcg/act inhaler Inhale 2 puffs every 4 (four) hours as needed for wheezing   • [DISCONTINUED] loratadine (CLARITIN) 10 mg tablet Take 1 tablet (10 mg total) by mouth daily   • [DISCONTINUED] albuterol (ProAir HFA) 90 mcg/act inhaler Inhale 2 puffs every 6 (six) hours as needed for wheezing (Patient not taking: "Reported on 5/11/2023)   • [DISCONTINUED] albuterol (PROVENTIL HFA,VENTOLIN HFA) 90 mcg/act inhaler Inhale 1-2 puffs every 6 (six) hours as needed for wheezing (Patient not taking: Reported on 5/11/2023)   • [DISCONTINUED] methylPREDNISolone 4 MG tablet therapy pack Use as directed on package (Patient not taking: Reported on 5/11/2023)   • [DISCONTINUED] montelukast (SINGULAIR) 10 mg tablet Take 1 tablet (10 mg total) by mouth daily at bedtime for 10 days (Patient not taking: Reported on 5/11/2023)       Objective     /62 (BP Location: Left arm, Patient Position: Sitting, Cuff Size: Standard)   Pulse 56   Temp 97 8 °F (36 6 °C)   Resp 14   Ht 5' 11\" (1 803 m)   Wt 55 9 kg (123 lb 3 2 oz)   SpO2 97%   BMI 17 18 kg/m²     Physical Exam  Constitutional:       General: He is not in acute distress  HENT:      Head: Normocephalic  Mouth/Throat:      Pharynx: No oropharyngeal exudate  Eyes:      General: No scleral icterus  Conjunctiva/sclera: Conjunctivae normal       Pupils: Pupils are equal, round, and reactive to light  Neck:      Thyroid: No thyromegaly  Cardiovascular:      Rate and Rhythm: Normal rate and regular rhythm  Heart sounds: Normal heart sounds  No murmur heard  Pulmonary:      Effort: Pulmonary effort is normal  No respiratory distress  Breath sounds: Normal breath sounds  No wheezing or rales  Abdominal:      General: Bowel sounds are normal  There is no distension  Palpations: Abdomen is soft  Tenderness: There is no abdominal tenderness  There is no guarding or rebound  Musculoskeletal:         General: Tenderness present  Cervical back: Neck supple  Lymphadenopathy:      Cervical: No cervical adenopathy  Skin:     Findings: No rash  Comments: wound on left face    Neurological:      Mental Status: He is alert and oriented to person, place, and time  Sensory: Sensory deficit present  Motor: No weakness         Heron Rainey" MD

## 2023-05-25 ENCOUNTER — OFFICE VISIT (OUTPATIENT)
Dept: FAMILY MEDICINE CLINIC | Facility: CLINIC | Age: 23
End: 2023-05-25

## 2023-05-25 VITALS
HEIGHT: 71 IN | BODY MASS INDEX: 17.22 KG/M2 | WEIGHT: 123 LBS | HEART RATE: 61 BPM | OXYGEN SATURATION: 99 % | SYSTOLIC BLOOD PRESSURE: 118 MMHG | RESPIRATION RATE: 14 BRPM | DIASTOLIC BLOOD PRESSURE: 80 MMHG | TEMPERATURE: 97.6 F

## 2023-05-25 DIAGNOSIS — J45.901 ASTHMA WITH ACUTE EXACERBATION, UNSPECIFIED ASTHMA SEVERITY, UNSPECIFIED WHETHER PERSISTENT: ICD-10-CM

## 2023-05-25 DIAGNOSIS — Z48.02 ENCOUNTER FOR REMOVAL OF SUTURES: Primary | ICD-10-CM

## 2023-05-25 DIAGNOSIS — L03.115 CELLULITIS OF RIGHT LOWER EXTREMITY: ICD-10-CM

## 2023-05-25 DIAGNOSIS — Z11.4 SCREENING FOR HIV (HUMAN IMMUNODEFICIENCY VIRUS): ICD-10-CM

## 2023-05-25 DIAGNOSIS — Z11.59 NEED FOR HEPATITIS C SCREENING TEST: ICD-10-CM

## 2023-05-25 RX ORDER — AMOXICILLIN AND CLAVULANATE POTASSIUM 875; 125 MG/1; MG/1
1 TABLET, FILM COATED ORAL EVERY 12 HOURS SCHEDULED
Qty: 10 TABLET | Refills: 0 | Status: SHIPPED | OUTPATIENT
Start: 2023-05-25 | End: 2023-05-30

## 2023-05-25 RX ORDER — LORATADINE 10 MG/1
10 TABLET ORAL DAILY
Qty: 90 TABLET | Refills: 3 | Status: SHIPPED | OUTPATIENT
Start: 2023-05-25

## 2023-05-25 RX ORDER — ALBUTEROL SULFATE 90 UG/1
2 AEROSOL, METERED RESPIRATORY (INHALATION) EVERY 6 HOURS PRN
Qty: 8.5 G | Refills: 0 | Status: SHIPPED | OUTPATIENT
Start: 2023-05-25

## 2023-05-25 NOTE — PROGRESS NOTES
Name: Roberto Ortiz      : 2000      MRN: [id number]  Encounter Provider: Tan Junior MD  Encounter Date: 2023   Encounter department: [address] [place] [address]     1  Encounter for removal of sutures    2  Need for hepatitis C screening test  -     Hepatitis C Antibody; Future    3  Screening for HIV (human immunodeficiency virus)  -     HIV 1/2 AG/AB w Reflex [facility] for 2 yr old and above; Future    4  Asthma with acute exacerbation, unspecified asthma severity, unspecified whether persistent  -     loratadine (CLARITIN) 10 mg tablet; Take 1 tablet (10 mg total) by mouth daily  -     albuterol (ProAir HFA) 90 mcg/act inhaler; Inhale 2 puffs every 6 (six) hours as needed for wheezing    5  Cellulitis of right lower extremity  -     mupirocin (BACTROBAN) 2 % ointment; Apply topically 3 (three) times a day  -     amoxicillin-clavulanate (AUGMENTIN) 875-125 mg per tablet; Take 1 tablet by mouth every 12 (twelve) hours for 5 days With food/Meals    Three sutures Removed off left facial area, near to left eye, without any complications  RTC in 3 mos w Blood work       Subjective      22 Y O Man is here for Regular Check up, and remove sutures , post ER Visit,   Review of Systems   Constitutional: Negative for chills, fatigue and fever  HENT: Negative for congestion, facial swelling, sore throat, trouble swallowing and voice change  Eyes: Negative for pain, discharge and visual disturbance  Respiratory: Negative for cough, shortness of breath and wheezing  Cardiovascular: Negative for chest pain, palpitations and leg swelling  Gastrointestinal: Negative for abdominal pain, blood in stool, constipation, diarrhea and nausea  Endocrine: Negative for polydipsia, polyphagia and polyuria  Genitourinary: Negative for difficulty urinating, hematuria and urgency  Musculoskeletal: Negative for arthralgias and myalgias  Skin: Positive for wound  "Negative for rash  Neurological: Negative for dizziness, tremors, weakness and headaches  Hematological: Negative for adenopathy  Does not bruise/bleed easily  Psychiatric/Behavioral: Negative for dysphoric mood, sleep disturbance and suicidal ideas  Current Outpatient Medications on File Prior to Visit   Medication Sig   • [DISCONTINUED] albuterol (ProAir HFA) 90 mcg/act inhaler Inhale 2 puffs every 6 (six) hours as needed for wheezing   • [DISCONTINUED] loratadine (CLARITIN) 10 mg tablet Take 1 tablet (10 mg total) by mouth daily       Objective     /80 (BP Location: Left arm, Patient Position: Sitting, Cuff Size: Standard)   Pulse 61   Temp 97 6 °F (36 4 °C) (Tympanic)   Resp 14   Ht 5' 11\" (1 803 m)   Wt 55 8 kg (123 lb)   SpO2 99%   BMI 17 16 kg/m²     Physical Exam  Constitutional:       General: He is not in acute distress  HENT:      Head: Normocephalic  Mouth/Throat:      Pharynx: No oropharyngeal exudate  Eyes:      General: No scleral icterus  Conjunctiva/sclera: Conjunctivae normal       Pupils: Pupils are equal, round, and reactive to light  Neck:      Thyroid: No thyromegaly  Cardiovascular:      Rate and Rhythm: Normal rate and regular rhythm  Heart sounds: Normal heart sounds  No murmur heard  Pulmonary:      Effort: Pulmonary effort is normal  No respiratory distress  Breath sounds: Normal breath sounds  No wheezing or rales  Abdominal:      General: Bowel sounds are normal  There is no distension  Palpations: Abdomen is soft  Tenderness: There is no abdominal tenderness  There is no guarding or rebound  Musculoskeletal:         General: No tenderness  Cervical back: Neck supple  Lymphadenopathy:      Cervical: No cervical adenopathy  Skin:     Coloration: Skin is not pale  Findings: Erythema and rash present  Neurological:      Mental Status: He is alert and oriented to person, place, and time        Sensory: No " sensory deficit  Motor: No weakness         Onjim January, MD

## 2023-05-25 NOTE — PROGRESS NOTES
BMI Counseling: Body mass index is 17 16 kg/m²  The BMI is below normal  Patient was advised to gain weight

## 2024-01-22 ENCOUNTER — APPOINTMENT (EMERGENCY)
Dept: RADIOLOGY | Facility: HOSPITAL | Age: 24
End: 2024-01-22
Payer: COMMERCIAL

## 2024-01-22 ENCOUNTER — HOSPITAL ENCOUNTER (EMERGENCY)
Facility: HOSPITAL | Age: 24
Discharge: HOME | End: 2024-01-22
Attending: EMERGENCY MEDICINE
Payer: COMMERCIAL

## 2024-01-22 VITALS
WEIGHT: 130.29 LBS | SYSTOLIC BLOOD PRESSURE: 137 MMHG | DIASTOLIC BLOOD PRESSURE: 74 MMHG | BODY MASS INDEX: 18.17 KG/M2 | OXYGEN SATURATION: 99 % | HEART RATE: 96 BPM | RESPIRATION RATE: 20 BRPM | TEMPERATURE: 98.6 F

## 2024-01-22 DIAGNOSIS — S60.221A CONTUSION OF RIGHT HAND, INITIAL ENCOUNTER: Primary | ICD-10-CM

## 2024-01-22 PROCEDURE — 73130 X-RAY EXAM OF HAND: CPT

## 2024-01-22 PROCEDURE — 99283 EMERGENCY DEPT VISIT LOW MDM: CPT

## 2024-01-22 PROCEDURE — 99284 EMERGENCY DEPT VISIT MOD MDM: CPT

## 2024-01-22 NOTE — Clinical Note
Paulino Santos was seen and treated in our emergency department on 1/22/2024.    No restrictions            Diagnosis:     Paulino  .    He may return on this date: 01/23/2024         If you have any questions or concerns, please don't hesitate to call.      Colby Dubon PA-C    ______________________________           _______________          _______________  Hospital Representative                              Date                                Time

## 2024-01-23 NOTE — ED PROVIDER NOTES
History  Chief Complaint   Patient presents with    Hand Injury     Pt reports getting into an argument and punching a door today. Pt states his hand is very painful.      Patient is a 23-year-old male coming in for evaluation of right hand pain after punching a door today.  Swelling of fifth metacarpal.  Normal sensation.      Hand Injury  Location:  Hand  Injury: yes    Associated symptoms: decreased range of motion and swelling    Associated symptoms: no stiffness        Prior to Admission Medications   Prescriptions Last Dose Informant Patient Reported? Taking?   albuterol (ProAir HFA) 90 mcg/act inhaler   No No   Sig: Inhale 2 puffs every 6 (six) hours as needed for wheezing   loratadine (CLARITIN) 10 mg tablet   No No   Sig: Take 1 tablet (10 mg total) by mouth daily   mupirocin (BACTROBAN) 2 % ointment   No No   Sig: Apply topically 3 (three) times a day      Facility-Administered Medications: None       Past Medical History:   Diagnosis Date    Asthma     Scoliosis        History reviewed. No pertinent surgical history.    Family History   Problem Relation Age of Onset    No Known Problems Mother     No Known Problems Father      I have reviewed and agree with the history as documented.    E-Cigarette/Vaping    E-Cigarette Use Never User      E-Cigarette/Vaping Substances    Nicotine No     THC No     CBD No     Flavoring No      Social History     Tobacco Use    Smoking status: Former     Current packs/day: 0.20     Types: Cigarettes    Smokeless tobacco: Never    Tobacco comments:     cole 4/2020   Vaping Use    Vaping status: Never Used   Substance Use Topics    Alcohol use: No    Drug use: Not Currently     Types: Marijuana       Review of Systems   Musculoskeletal:  Positive for arthralgias and joint swelling. Negative for stiffness.       Physical Exam  Physical Exam  Vitals reviewed.   Constitutional:       Appearance: Normal appearance. He is normal weight.   HENT:      Head: Normocephalic and  atraumatic.      Right Ear: External ear normal.      Left Ear: External ear normal.      Nose: Nose normal.   Eyes:      Conjunctiva/sclera: Conjunctivae normal.   Cardiovascular:      Rate and Rhythm: Normal rate.   Pulmonary:      Effort: Pulmonary effort is normal.   Musculoskeletal:         General: Tenderness present. Normal range of motion.      Cervical back: Normal range of motion.      Comments: Tenderness to palpation of the right fifth metacarpal.  Capillary fill less than 2 seconds.  Normal sensation   Skin:     General: Skin is warm and dry.   Neurological:      Mental Status: He is alert.         Vital Signs  ED Triage Vitals [01/22/24 1659]   Temperature Pulse Respirations Blood Pressure SpO2   98.6 °F (37 °C) 96 20 137/74 99 %      Temp Source Heart Rate Source Patient Position - Orthostatic VS BP Location FiO2 (%)   Oral Monitor Sitting Left arm --      Pain Score       --           Vitals:    01/22/24 1659   BP: 137/74   Pulse: 96   Patient Position - Orthostatic VS: Sitting         Visual Acuity      ED Medications  Medications - No data to display    Diagnostic Studies  Results Reviewed       None                   XR hand 3+ views RIGHT    (Results Pending)              Procedures  Procedures         ED Course                               SBIRT 22yo+      Flowsheet Row Most Recent Value   Initial Alcohol Screen: US AUDIT-C     1. How often do you have a drink containing alcohol? 0 Filed at: 01/22/2024 1701   2. How many drinks containing alcohol do you have on a typical day you are drinking?  0 Filed at: 01/22/2024 1701   3a. Male UNDER 65: How often do you have five or more drinks on one occasion? 0 Filed at: 01/22/2024 1701   3b. FEMALE Any Age, or MALE 65+: How often do you have 4 or more drinks on one occassion? 0 Filed at: 01/22/2024 1701   Audit-C Score 0 Filed at: 01/22/2024 1701   KELECHI: How many times in the past year have you...    Used an illegal drug or used a prescription  medication for non-medical reasons? Never Filed at: 01/22/2024 1701                      Medical Decision Making  Patient is a 23-year-old male comes in for hand pain after punching a door.  Is in no acute distress at this time.  X-ray as read by me shows no sign of acute osseous MI.  I did offer a splint to patient, however patient has declined at this time, and would like only an ace wrap    Amount and/or Complexity of Data Reviewed  Radiology: ordered.             Disposition  Final diagnoses:   Contusion of right hand, initial encounter     Time reflects when diagnosis was documented in both MDM as applicable and the Disposition within this note       Time User Action Codes Description Comment    1/22/2024  6:24 PM Colby Dubon Add [S60.221A] Contusion of right hand, initial encounter           ED Disposition       ED Disposition   Discharge    Condition   Stable    Date/Time   Mon Jan 22, 2024 1824    Comment   Paulino Santos discharge to home/self care.                   Follow-up Information       Follow up With Specialties Details Why Contact Info Additional Information    Heron Blanchard MD Internal Medicine   [address] [place] [address] [id number] [phone]       [facility] Emergency Department Emergency Medicine  As needed, If symptoms worsen [address] [place] [id number] [phone] [facility] Emergency Department            Discharge Medication List as of 1/22/2024  6:24 PM        CONTINUE these medications which have NOT CHANGED    Details   albuterol (ProAir HFA) 90 mcg/act inhaler Inhale 2 puffs every 6 (six) hours as needed for wheezing, Starting Thu 5/25/2023, Normal      loratadine (CLARITIN) 10 mg tablet Take 1 tablet (10 mg total) by mouth daily, Starting Thu 5/25/2023, Normal      mupirocin (BACTROBAN) 2 % ointment Apply topically 3 (three) times a day, Starting u 5/25/2023, Normal             No discharge procedures on  file.    PDMP Review       None            ED Provider  Electronically Signed by             Colby Dubon PA-C  01/22/24 9295

## 2024-02-27 ENCOUNTER — HOSPITAL ENCOUNTER (EMERGENCY)
Facility: HOSPITAL | Age: 24
Discharge: HOME | End: 2024-02-27
Attending: EMERGENCY MEDICINE
Payer: COMMERCIAL

## 2024-02-27 VITALS
TEMPERATURE: 98.1 F | SYSTOLIC BLOOD PRESSURE: 112 MMHG | HEART RATE: 71 BPM | WEIGHT: 129.1 LBS | BODY MASS INDEX: 18.01 KG/M2 | OXYGEN SATURATION: 99 % | DIASTOLIC BLOOD PRESSURE: 74 MMHG | RESPIRATION RATE: 18 BRPM

## 2024-02-27 DIAGNOSIS — J06.9 VIRAL URI WITH COUGH: Primary | ICD-10-CM

## 2024-02-27 DIAGNOSIS — J45.20 MILD INTERMITTENT ASTHMA WITHOUT COMPLICATION: ICD-10-CM

## 2024-02-27 LAB
FLUAV RNA RESP QL NAA+PROBE: NEGATIVE
FLUBV RNA RESP QL NAA+PROBE: NEGATIVE
RSV RNA RESP QL NAA+PROBE: NEGATIVE
SARS-COV-2 RNA RESP QL NAA+PROBE: NEGATIVE

## 2024-02-27 PROCEDURE — 99284 EMERGENCY DEPT VISIT MOD MDM: CPT

## 2024-02-27 PROCEDURE — 0241U HB NFCT DS VIR RESP RNA 4 TRGT: CPT

## 2024-02-27 PROCEDURE — 99283 EMERGENCY DEPT VISIT LOW MDM: CPT

## 2024-02-27 RX ORDER — BENZONATATE 100 MG/1
100 CAPSULE ORAL EVERY 8 HOURS
Qty: 21 CAPSULE | Refills: 0 | Status: SHIPPED | OUTPATIENT
Start: 2024-02-27

## 2024-02-27 RX ORDER — ALBUTEROL SULFATE 90 UG/1
2 AEROSOL, METERED RESPIRATORY (INHALATION) EVERY 4 HOURS PRN
Qty: 8 G | Refills: 0 | Status: SHIPPED | OUTPATIENT
Start: 2024-02-27

## 2024-02-27 NOTE — ED PROVIDER NOTES
History  Chief Complaint   Patient presents with    Flu Symptoms     Symptoms since Sunday, states he missed work on Monday. Used inhaler last night.      Paulino is a 23-year-old male with a past medical history of asthma presenting to the ED for upper respiratory symptoms x 2 days.  Reports that he had to miss work yesterday due to the symptoms.  He has a history of asthma and needed to use his inhaler yesterday but that did not seem to relieve the symptoms.  Has associated congestion, rhinorrhea, cough, chest tightness, shortness of breath, headaches.  Denies nausea, vomiting, diarrhea, abdominal pain.  No known sick contacts.  Took Mucinex with some minor relief.        Prior to Admission Medications   Prescriptions Last Dose Informant Patient Reported? Taking?   albuterol (ProAir HFA) 90 mcg/act inhaler   No No   Sig: Inhale 2 puffs every 6 (six) hours as needed for wheezing   loratadine (CLARITIN) 10 mg tablet   No No   Sig: Take 1 tablet (10 mg total) by mouth daily   mupirocin (BACTROBAN) 2 % ointment   No No   Sig: Apply topically 3 (three) times a day      Facility-Administered Medications: None       Past Medical History:   Diagnosis Date    Asthma     Scoliosis        History reviewed. No pertinent surgical history.    Family History   Problem Relation Age of Onset    No Known Problems Mother     No Known Problems Father      I have reviewed and agree with the history as documented.    E-Cigarette/Vaping    E-Cigarette Use Never User      E-Cigarette/Vaping Substances    Nicotine No     THC No     CBD No     Flavoring No      Social History     Tobacco Use    Smoking status: Former     Current packs/day: 0.20     Types: Cigarettes    Smokeless tobacco: Never    Tobacco comments:     cole 4/2020   Vaping Use    Vaping status: Never Used   Substance Use Topics    Alcohol use: No    Drug use: Not Currently     Types: Marijuana       Review of Systems   Constitutional:  Negative for chills and fever.   HENT:   Positive for congestion and rhinorrhea. Negative for sore throat.    Respiratory:  Positive for cough, chest tightness and shortness of breath.    Cardiovascular:  Negative for chest pain and palpitations.   Gastrointestinal:  Negative for abdominal pain, diarrhea, nausea and vomiting.   Musculoskeletal:  Negative for myalgias.   Neurological:  Positive for headaches.       Physical Exam  Physical Exam  Vitals reviewed.   Constitutional:       General: He is not in acute distress.     Appearance: Normal appearance. He is not ill-appearing, toxic-appearing or diaphoretic.   HENT:      Head: Normocephalic and atraumatic.      Right Ear: Tympanic membrane, ear canal and external ear normal.      Left Ear: Tympanic membrane, ear canal and external ear normal.      Nose: Congestion present.      Mouth/Throat:      Mouth: Mucous membranes are moist.      Pharynx: Oropharynx is clear. Posterior oropharyngeal erythema present. No oropharyngeal exudate.   Eyes:      General: No scleral icterus.        Right eye: No discharge.         Left eye: No discharge.      Extraocular Movements: Extraocular movements intact.      Conjunctiva/sclera: Conjunctivae normal.      Pupils: Pupils are equal, round, and reactive to light.   Cardiovascular:      Rate and Rhythm: Normal rate and regular rhythm.      Pulses: Normal pulses.      Heart sounds: Normal heart sounds.   Pulmonary:      Effort: Pulmonary effort is normal. No respiratory distress.      Breath sounds: Normal breath sounds. No stridor. No wheezing, rhonchi or rales.   Chest:      Chest wall: No tenderness.   Musculoskeletal:         General: Normal range of motion.      Cervical back: Normal range of motion and neck supple. No rigidity or tenderness.   Lymphadenopathy:      Cervical: Cervical adenopathy present.   Skin:     General: Skin is warm and dry.      Capillary Refill: Capillary refill takes less than 2 seconds.   Neurological:      General: No focal deficit  present.      Mental Status: He is alert.   Psychiatric:         Mood and Affect: Mood normal.         Behavior: Behavior normal.         Vital Signs  ED Triage Vitals [02/27/24 1400]   Temperature Pulse Respirations Blood Pressure SpO2   98.1 °F (36.7 °C) 71 18 112/74 99 %      Temp Source Heart Rate Source Patient Position - Orthostatic VS BP Location FiO2 (%)   Tympanic Monitor Sitting Left arm --      Pain Score       --           Vitals:    02/27/24 1400   BP: 112/74   Pulse: 71   Patient Position - Orthostatic VS: Sitting         Visual Acuity      ED Medications  Medications - No data to display    Diagnostic Studies  Results Reviewed       Procedure Component Value Units Date/Time    FLU/RSV/COVID - if FLU/RSV clinically relevant [[id number]]  (Normal) Collected: 02/27/24 1424    Lab Status: Final result Specimen: Nares from Nose Updated: 02/27/24 1524     SARS-CoV-2 Negative     INFLUENZA A PCR Negative     INFLUENZA B PCR Negative     RSV PCR Negative    Narrative:      FOR PEDIATRIC PATIENTS - copy/paste COVID Guidelines URL to browser: [email or web address]    SARS-CoV-2 assay is a Nucleic Acid Amplification assay intended for the  qualitative detection of nucleic acid from SARS-CoV-2 in nasopharyngeal  swabs. Results are for the presumptive identification of SARS-CoV-2 RNA.    Positive results are indicative of infection with SARS-CoV-2, the virus  causing COVID-19, but do not rule out bacterial infection or co-infection  with other viruses. Laboratories within the [place] and its  territories are required to report all positive results to the appropriate  public health authorities. Negative results do not preclude SARS-CoV-2  infection and should not be used as the sole basis for treatment or other  patient management decisions. Negative results must be combined with  clinical observations, patient history, and epidemiological information.  This  test has not been FDA cleared or approved.    This test has been authorized by FDA under an Emergency Use Authorization  (EUA). This test is only authorized for the duration of time the  declaration that circumstances exist justifying the authorization of the  emergency use of an in vitro diagnostic tests for detection of SARS-CoV-2  virus and/or diagnosis of COVID-19 infection under section 564(b)(1) of  the Act, 21 U.S.C. 360bbb-3(b)(1), unless the authorization is terminated  or revoked sooner. The test has been validated but independent review by FDA  and CLIA is pending.    Test performed using [employer] GeneXpert: This RT-PCR assay targets N2,  a region unique to SARS-CoV-2. A conserved region in the E-gene was chosen  for pan-Sarbecovirus detection which includes SARS-CoV-2.    According to CMS-2020-01-R, this platform meets the definition of high-throughput technology.                   No orders to display              Procedures  Procedures         ED Course  ED Course as of 02/27/24 1955   Tue Feb 27, 2024   1409 Patient would like to wait in the waiting room for results.                               SBIRT 22yo+      Flowsheet Row Most Recent Value   Initial Alcohol Screen: US AUDIT-C     1. How often do you have a drink containing alcohol? 0 Filed at: 02/27/2024 1401   2. How many drinks containing alcohol do you have on a typical day you are drinking?  0 Filed at: 02/27/2024 1401   3a. Male UNDER 65: How often do you have five or more drinks on one occasion? 0 Filed at: 02/27/2024 1401   Audit-C Score 0 Filed at: 02/27/2024 1401   KELECHI: How many times in the past year have you...    Used an illegal drug or used a prescription medication for non-medical reasons? Never Filed at: 02/27/2024 1401                      Medical Decision Making  23-year-old male presenting to the ED for upper respiratory symptoms with cough x 2 days.  Plan to test for COVID, flu, RSV.  Patient is in no acute distress, lungs are  clear to auscultation without wheezing.  Patient needs refill on his albuterol inhaler, so I sent this to the pharmacy along with Tessalon Perles.  Discussed supportive care options. Pt stable at time of discharge, vital signs reviewed, questions answered. Strict ER return precautions provided/discussed and were well understood by patient.  A verbal dictation device was used in the writing of this note. Please excuse any grammatical errors or transposition of look a like/sound a like words.      Problems Addressed:  Mild intermittent asthma without complication: chronic illness or injury  Viral URI with cough: acute illness or injury    Amount and/or Complexity of Data Reviewed  Labs: ordered.    Risk  Prescription drug management.             Disposition  Final diagnoses:   Viral URI with cough   Mild intermittent asthma without complication     Time reflects when diagnosis was documented in both MDM as applicable and the Disposition within this note       Time User Action Codes Description Comment    2/27/2024  2:08 PM Lexi Chase [J06.9] Viral URI with cough     2/27/2024  2:08 PM Lexi Chase [J45.20] Mild intermittent asthma without complication           ED Disposition       ED Disposition   Discharge    Condition   Stable    Date/Time   Tue Feb 27, 2024 1410    Comment   Paulino Santos discharge to home/self care.                   Follow-up Information       Follow up With Specialties Details Why Contact Info Additional Information    Heron Blanchard MD Internal Medicine Schedule an appointment as soon as possible for a visit  Follow up [address] [place] [id number] [phone]       [facility] Emergency Medicine Go to  If symptoms worsen [address] [facility] [address] [phone] [facility]            Discharge Medication List as of 2/27/2024  2:10 PM        START taking these  medications    Details   benzonatate (TESSALON PERLES) 100 mg capsule Take 1 capsule (100 mg total) by mouth every 8 (eight) hours, Starting Tue 2/27/2024, Normal           CONTINUE these medications which have CHANGED    Details   albuterol (ProAir HFA) 90 mcg/act inhaler Inhale 2 puffs every 4 (four) hours as needed for wheezing, Starting Tue 2/27/2024, Normal           CONTINUE these medications which have NOT CHANGED    Details   loratadine (CLARITIN) 10 mg tablet Take 1 tablet (10 mg total) by mouth daily, Starting u 5/25/2023, Normal      mupirocin (BACTROBAN) 2 % ointment Apply topically 3 (three) times a day, Starting Thu 5/25/2023, Normal             No discharge procedures on file.    PDMP Review       None            ED Provider  Electronically Signed by             Lexi Chase PA-C  02/27/24 1955       Lexi Chase PA-C  02/27/24 1955

## 2024-02-27 NOTE — Clinical Note
Paulino Santos was seen and treated in our emergency department on 2/27/2024.                Diagnosis:     Paulino  may return to work on return date.    He may return on this date:          If you have any questions or concerns, please don't hesitate to call.      Lexi Chase PA-C    ______________________________           _______________          _______________  Hospital Representative                              Date                                Time

## 2024-02-27 NOTE — DISCHARGE INSTRUCTIONS
"For congestion: Nasacort nasal sprays in the morning/night, aim away from the middle of the nose. Can bridge the doses with saline spray. NetiPot with distilled warmed water can provide further relief as well.   For cough: Tessalon Perles prescribed to the pharmacy on file. Take as needed. Can also take over the counter cold medications. \"Expectorant\" means it may make you cough more. \"Suppressant\" will relieve.  For further supportive care: Rotate Tylenol and Motrin every 3 hours for best relief. For example, take Motrin then in 3 hours take Tylenol then 3 hours Motrin, repeat. Maximum Tylenol daily: 4,000 mg. Maximum ibuprofen daily: 3,600 mg.  Sleep with a humidifier, consider applying Vicks Vapor Rub to the chest.    "

## 2024-05-09 ENCOUNTER — TELEPHONE (OUTPATIENT)
Age: 24
End: 2024-05-09

## 2024-05-09 ENCOUNTER — APPOINTMENT (OUTPATIENT)
Dept: RADIOLOGY | Facility: CLINIC | Age: 24
End: 2024-05-09
Payer: OTHER MISCELLANEOUS

## 2024-05-09 ENCOUNTER — APPOINTMENT (OUTPATIENT)
Dept: URGENT CARE | Facility: CLINIC | Age: 24
End: 2024-05-09
Payer: OTHER MISCELLANEOUS

## 2024-05-09 DIAGNOSIS — S89.92XA INJURY OF LEFT LOWER EXTREMITY, INITIAL ENCOUNTER: ICD-10-CM

## 2024-05-09 DIAGNOSIS — S89.92XA INJURY OF LEFT LOWER EXTREMITY, INITIAL ENCOUNTER: Primary | ICD-10-CM

## 2024-05-09 PROCEDURE — G0382 LEV 3 HOSP TYPE B ED VISIT: HCPCS

## 2024-05-09 PROCEDURE — 73610 X-RAY EXAM OF ANKLE: CPT

## 2024-05-09 PROCEDURE — 73630 X-RAY EXAM OF FOOT: CPT

## 2024-05-09 PROCEDURE — 99213 OFFICE O/P EST LOW 20 MIN: CPT | Performed by: NURSE PRACTITIONER

## 2024-05-09 PROCEDURE — 73590 X-RAY EXAM OF LOWER LEG: CPT

## 2024-05-09 PROCEDURE — 99283 EMERGENCY DEPT VISIT LOW MDM: CPT

## 2024-05-09 NOTE — TELEPHONE ENCOUNTER
Documentation Only:    Received a call from Adin at [facility] regarding patient's injury claim.  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      I spoke to someone at Dr. Blanchard's office who said that patient would have to be seen by Workman's Comp doctors for 90 days before being seen at their office.    I left a message for this patient on his voicemail.

## 2024-05-14 ENCOUNTER — APPOINTMENT (OUTPATIENT)
Dept: URGENT CARE | Facility: CLINIC | Age: 24
End: 2024-05-14
Payer: OTHER MISCELLANEOUS

## 2024-05-14 PROCEDURE — 99213 OFFICE O/P EST LOW 20 MIN: CPT | Performed by: FAMILY MEDICINE

## 2024-06-23 ENCOUNTER — HOSPITAL ENCOUNTER (EMERGENCY)
Facility: HOSPITAL | Age: 24
Discharge: HOME | End: 2024-06-23
Attending: EMERGENCY MEDICINE
Payer: OTHER MISCELLANEOUS

## 2024-06-23 VITALS
TEMPERATURE: 98 F | OXYGEN SATURATION: 99 % | DIASTOLIC BLOOD PRESSURE: 60 MMHG | HEART RATE: 58 BPM | SYSTOLIC BLOOD PRESSURE: 104 MMHG | RESPIRATION RATE: 16 BRPM | WEIGHT: 126.4 LBS | BODY MASS INDEX: 17.63 KG/M2

## 2024-06-23 DIAGNOSIS — M79.605 LEFT LEG PAIN: Primary | ICD-10-CM

## 2024-06-23 PROCEDURE — 99283 EMERGENCY DEPT VISIT LOW MDM: CPT

## 2024-06-23 PROCEDURE — 99283 EMERGENCY DEPT VISIT LOW MDM: CPT | Performed by: EMERGENCY MEDICINE

## 2024-06-23 NOTE — Clinical Note
Paulino Santos was seen and treated in our emergency department on 6/23/2024.                Diagnosis:     Paulino  may return to work on return date.    He may return on this date: 06/28/2024         If you have any questions or concerns, please don't hesitate to call.      Alberto Gonzalez MD    ______________________________           _______________          _______________  Hospital Representative                              Date                                Time

## 2024-06-23 NOTE — ED PROVIDER NOTES
History  Chief Complaint   Patient presents with    Leg Pain     Fell 15 feet on May 9th and  having left lower leg and ankle pain while at work.     23-year-old male presenting to the emergency department with left leg pain since a fall causing a sprain and contusion of the left shin.  This happened while at work.  Notes that he was up 15 feet on a ladder, did have x-rays done at that point without fractures.  At this point notes that pain is mostly resolved, small amount of pain with certain movements but notes that he feels like he is ready to go back to work.        Prior to Admission Medications   Prescriptions Last Dose Informant Patient Reported? Taking?   albuterol (ProAir HFA) 90 mcg/act inhaler Not Taking  No No   Sig: Inhale 2 puffs every 4 (four) hours as needed for wheezing   Patient not taking: Reported on 6/23/2024   benzonatate (TESSALON PERLES) 100 mg capsule Not Taking  No No   Sig: Take 1 capsule (100 mg total) by mouth every 8 (eight) hours   Patient not taking: Reported on 6/23/2024   loratadine (CLARITIN) 10 mg tablet Not Taking  No No   Sig: Take 1 tablet (10 mg total) by mouth daily   Patient not taking: Reported on 6/23/2024   mupirocin (BACTROBAN) 2 % ointment Not Taking  No No   Sig: Apply topically 3 (three) times a day   Patient not taking: Reported on 6/23/2024      Facility-Administered Medications: None       Past Medical History:   Diagnosis Date    Asthma     Scoliosis        History reviewed. No pertinent surgical history.    Family History   Problem Relation Age of Onset    No Known Problems Mother     No Known Problems Father      I have reviewed and agree with the history as documented.    E-Cigarette/Vaping    E-Cigarette Use Never User      E-Cigarette/Vaping Substances    Nicotine No     THC No     CBD No     Flavoring No      Social History     Tobacco Use    Smoking status: Former     Current packs/day: 0.20     Types: Cigarettes    Smokeless tobacco: Never    Tobacco  comments:     cole 4/2020   Vaping Use    Vaping status: Never Used   Substance Use Topics    Alcohol use: No    Drug use: Not Currently     Types: Marijuana       Review of Systems   Constitutional:  Negative for chills and fever.   HENT:  Negative for ear pain and sore throat.    Eyes:  Negative for pain and visual disturbance.   Respiratory:  Negative for cough and shortness of breath.    Cardiovascular:  Negative for chest pain and palpitations.   Gastrointestinal:  Negative for abdominal pain and vomiting.   Genitourinary:  Negative for dysuria and hematuria.   Musculoskeletal:  Positive for arthralgias. Negative for back pain.   Skin:  Negative for color change and rash.   Neurological:  Negative for seizures and syncope.   All other systems reviewed and are negative.      Physical Exam  Physical Exam  Vitals and nursing note reviewed.   Constitutional:       General: He is not in acute distress.     Appearance: He is well-developed.   HENT:      Head: Normocephalic and atraumatic.   Eyes:      Conjunctiva/sclera: Conjunctivae normal.   Cardiovascular:      Rate and Rhythm: Normal rate and regular rhythm.      Heart sounds: No murmur heard.  Pulmonary:      Effort: Pulmonary effort is normal. No respiratory distress.      Breath sounds: Normal breath sounds.   Abdominal:      Palpations: Abdomen is soft.      Tenderness: There is no abdominal tenderness.   Musculoskeletal:         General: No swelling.      Cervical back: Neck supple.   Skin:     General: Skin is warm and dry.      Capillary Refill: Capillary refill takes less than 2 seconds.   Neurological:      Mental Status: He is alert.   Psychiatric:         Mood and Affect: Mood normal.         Vital Signs  ED Triage Vitals [06/23/24 0908]   Temperature Pulse Respirations Blood Pressure SpO2   98 °F (36.7 °C) 58 16 104/60 99 %      Temp Source Heart Rate Source Patient Position - Orthostatic VS BP Location FiO2 (%)   Tympanic Monitor Sitting Right arm --       Pain Score       --           Vitals:    06/23/24 0908   BP: 104/60   Pulse: 58   Patient Position - Orthostatic VS: Sitting         Visual Acuity      ED Medications  Medications - No data to display    Diagnostic Studies  Results Reviewed       None                   No orders to display              Procedures  Procedures         ED Course                               SBIRT 22yo+      Flowsheet Row Most Recent Value   Initial Alcohol Screen: US AUDIT-C     1. How often do you have a drink containing alcohol? 0 Filed at: 06/23/2024 0914   2. How many drinks containing alcohol do you have on a typical day you are drinking?  0 Filed at: 06/23/2024 0914   3a. Male UNDER 65: How often do you have five or more drinks on one occasion? 0 Filed at: 06/23/2024 0914   Audit-C Score 0 Filed at: 06/23/2024 0914   KELECIH: How many times in the past year have you...    Used an illegal drug or used a prescription medication for non-medical reasons? Never Filed at: 06/23/2024 0914                      Medical Decision Making  23-year-old male with remote left leg injury with pain now essentially resolved, can return to work this weekend             Disposition  Final diagnoses:   Left leg pain     Time reflects when diagnosis was documented in both MDM as applicable and the Disposition within this note       Time User Action Codes Description Comment    6/23/2024  9:19 AM Alberto Gonzalez Add [M79.605] Left leg pain           ED Disposition       ED Disposition   Discharge    Condition   Stable    Date/Time   Sun Jun 23, 2024 0919    Comment   Paulino Santos discharge to home/self care.                   Follow-up Information       Follow up With Specialties Details Why Contact Info    Heron Blanchard MD Internal Medicine   [address] [place] [id number] [phone]              Patient's Medications   Discharge Prescriptions    No medications on file       No discharge procedures on file.    PDMP Review       None             ED Provider  Electronically Signed by             Alberto Gonzalez MD  06/23/24 0914

## 2025-01-11 ENCOUNTER — HOSPITAL ENCOUNTER (EMERGENCY)
Facility: HOSPITAL | Age: 25
Discharge: HOME | End: 2025-01-11
Attending: EMERGENCY MEDICINE
Payer: COMMERCIAL

## 2025-01-11 VITALS
HEIGHT: 70 IN | OXYGEN SATURATION: 96 % | BODY MASS INDEX: 19.33 KG/M2 | TEMPERATURE: 97.8 F | DIASTOLIC BLOOD PRESSURE: 74 MMHG | HEART RATE: 70 BPM | WEIGHT: 135 LBS | SYSTOLIC BLOOD PRESSURE: 112 MMHG | RESPIRATION RATE: 17 BRPM

## 2025-01-11 DIAGNOSIS — R11.2 NAUSEA AND VOMITING: Primary | ICD-10-CM

## 2025-01-11 DIAGNOSIS — Z11.3 SCREEN FOR STD (SEXUALLY TRANSMITTED DISEASE): ICD-10-CM

## 2025-01-11 LAB
ALBUMIN SERPL BCG-MCNC: 5 G/DL (ref 3.5–5)
ALP SERPL-CCNC: 61 U/L (ref 34–104)
ALT SERPL W P-5'-P-CCNC: 36 U/L (ref 7–52)
ANION GAP SERPL CALCULATED.3IONS-SCNC: 10 MMOL/L (ref 4–13)
AST SERPL W P-5'-P-CCNC: 37 U/L (ref 13–39)
BASOPHILS # BLD AUTO: 0.02 THOUSANDS/ΜL (ref 0–0.1)
BASOPHILS NFR BLD AUTO: 0 % (ref 0–1)
BILIRUB SERPL-MCNC: 1.31 MG/DL (ref 0.2–1)
BUN SERPL-MCNC: 17 MG/DL (ref 5–25)
C TRACH DNA SPEC QL NAA+PROBE: NEGATIVE
CALCIUM SERPL-MCNC: 9.8 MG/DL (ref 8.4–10.2)
CHLORIDE SERPL-SCNC: 103 MMOL/L (ref 96–108)
CO2 SERPL-SCNC: 26 MMOL/L (ref 21–32)
CREAT SERPL-MCNC: 1.12 MG/DL (ref 0.6–1.3)
EOSINOPHIL # BLD AUTO: 0.04 THOUSAND/ΜL (ref 0–0.61)
EOSINOPHIL NFR BLD AUTO: 0 % (ref 0–6)
ERYTHROCYTE [DISTWIDTH] IN BLOOD BY AUTOMATED COUNT: 11.9 % (ref 11.6–15.1)
GFR SERPL CREATININE-BSD FRML MDRD: 90 ML/MIN/1.73SQ M
GLUCOSE SERPL-MCNC: 99 MG/DL (ref 65–140)
HCT VFR BLD AUTO: 48.3 % (ref 36.5–49.3)
HGB BLD-MCNC: 16.1 G/DL (ref 12–17)
HIV 1+2 AB+HIV1 P24 AG SERPL QL IA: NORMAL
HIV1 P24 AG SER QL: NORMAL
IMM GRANULOCYTES # BLD AUTO: 0.03 THOUSAND/UL (ref 0–0.2)
IMM GRANULOCYTES NFR BLD AUTO: 0 % (ref 0–2)
LIPASE SERPL-CCNC: 18 U/L (ref 11–82)
LYMPHOCYTES # BLD AUTO: 0.99 THOUSANDS/ΜL (ref 0.6–4.47)
LYMPHOCYTES NFR BLD AUTO: 8 % (ref 14–44)
MCH RBC QN AUTO: 29.8 PG (ref 26.8–34.3)
MCHC RBC AUTO-ENTMCNC: 33.3 G/DL (ref 31.4–37.4)
MCV RBC AUTO: 89 FL (ref 82–98)
MONOCYTES # BLD AUTO: 0.43 THOUSAND/ΜL (ref 0.17–1.22)
MONOCYTES NFR BLD AUTO: 3 % (ref 4–12)
N GONORRHOEA DNA SPEC QL NAA+PROBE: NEGATIVE
NEUTROPHILS # BLD AUTO: 11.36 THOUSANDS/ΜL (ref 1.85–7.62)
NEUTS SEG NFR BLD AUTO: 89 % (ref 43–75)
NRBC BLD AUTO-RTO: 0 /100 WBCS
PLATELET # BLD AUTO: 249 THOUSANDS/UL (ref 149–390)
PMV BLD AUTO: 8.7 FL (ref 8.9–12.7)
POTASSIUM SERPL-SCNC: 4 MMOL/L (ref 3.5–5.3)
PROT SERPL-MCNC: 8 G/DL (ref 6.4–8.4)
RBC # BLD AUTO: 5.41 MILLION/UL (ref 3.88–5.62)
SODIUM SERPL-SCNC: 139 MMOL/L (ref 135–147)
WBC # BLD AUTO: 12.87 THOUSAND/UL (ref 4.31–10.16)

## 2025-01-11 PROCEDURE — 96375 TX/PRO/DX INJ NEW DRUG ADDON: CPT

## 2025-01-11 PROCEDURE — 80053 COMPREHEN METABOLIC PANEL: CPT | Performed by: EMERGENCY MEDICINE

## 2025-01-11 PROCEDURE — 99283 EMERGENCY DEPT VISIT LOW MDM: CPT

## 2025-01-11 PROCEDURE — 87806 HIV AG W/HIV1&2 ANTB W/OPTIC: CPT | Performed by: EMERGENCY MEDICINE

## 2025-01-11 PROCEDURE — 96374 THER/PROPH/DIAG INJ IV PUSH: CPT

## 2025-01-11 PROCEDURE — 87591 N.GONORRHOEAE DNA AMP PROB: CPT | Performed by: EMERGENCY MEDICINE

## 2025-01-11 PROCEDURE — 87491 CHLMYD TRACH DNA AMP PROBE: CPT | Performed by: EMERGENCY MEDICINE

## 2025-01-11 PROCEDURE — 83690 ASSAY OF LIPASE: CPT | Performed by: EMERGENCY MEDICINE

## 2025-01-11 PROCEDURE — 99284 EMERGENCY DEPT VISIT MOD MDM: CPT | Performed by: EMERGENCY MEDICINE

## 2025-01-11 PROCEDURE — 96361 HYDRATE IV INFUSION ADD-ON: CPT

## 2025-01-11 PROCEDURE — 85025 COMPLETE CBC W/AUTO DIFF WBC: CPT | Performed by: EMERGENCY MEDICINE

## 2025-01-11 PROCEDURE — 36415 COLL VENOUS BLD VENIPUNCTURE: CPT | Performed by: EMERGENCY MEDICINE

## 2025-01-11 RX ORDER — ONDANSETRON 4 MG/1
4 TABLET, ORALLY DISINTEGRATING ORAL EVERY 6 HOURS PRN
Qty: 21 TABLET | Refills: 0 | Status: SHIPPED | OUTPATIENT
Start: 2025-01-11

## 2025-01-11 RX ORDER — ONDANSETRON 2 MG/ML
4 INJECTION INTRAMUSCULAR; INTRAVENOUS ONCE
Status: COMPLETED | OUTPATIENT
Start: 2025-01-11 | End: 2025-01-11

## 2025-01-11 RX ORDER — KETOROLAC TROMETHAMINE 30 MG/ML
15 INJECTION, SOLUTION INTRAMUSCULAR; INTRAVENOUS ONCE
Status: COMPLETED | OUTPATIENT
Start: 2025-01-11 | End: 2025-01-11

## 2025-01-11 RX ADMIN — SODIUM CHLORIDE 1000 ML: 0.9 INJECTION, SOLUTION INTRAVENOUS at 03:56

## 2025-01-11 RX ADMIN — ONDANSETRON 4 MG: 2 INJECTION INTRAMUSCULAR; INTRAVENOUS at 03:53

## 2025-01-11 RX ADMIN — KETOROLAC TROMETHAMINE 15 MG: 30 INJECTION, SOLUTION INTRAMUSCULAR; INTRAVENOUS at 03:53

## 2025-01-11 NOTE — ED PROVIDER NOTES
Time reflects when diagnosis was documented in both MDM as applicable and the Disposition within this note       Time User Action Codes Description Comment    1/11/2025  4:22 AM Check, Damien Olmstead [R11.2] Nausea and vomiting     1/11/2025  4:31 AM Check, Damien Olmstead [Z11.3] Screen for STD (sexually transmitted disease)           ED Disposition       ED Disposition   Discharge    Condition   Stable    Date/Time   Sat Jan 11, 2025  4:22 AM    Comment   Paulino Santos discharge to home/self care.                   Assessment & Plan       Medical Decision Making  24-year-old male presents to the emergency department for evaluation of abdominal pain, nausea and vomiting.  On exam he is uncomfortable appearing, but in no acute distress.  Abdomen is soft, nontender nondistended without rebound or guarding.  Suspect symptoms likely secondary to viral illness.  Differential diagnosis also includes but is not limited to GERD, gastritis, peptic ulcer disease, biliary disease, pancreatitis, colitis.  Plan to check abdominal labs, treat symptomatically and reassess.    Labs grossly unremarkable.  Symptoms significantly improved following medications.  At time of discharge, patient was requesting screening for gonorrhea, chlamydia, and HIV.  He denies any genital lesions or penile discharge.  Will order testing and [facility]  He was given strict return precautions and was in agreement the plan.    Problems Addressed:  Nausea and vomiting: acute illness or injury  Screen for STD (sexually transmitted disease): acute illness or injury    Amount and/or Complexity of Data Reviewed  Independent Historian: EMS  External Data Reviewed: notes.  Labs: ordered.    Risk  OTC drugs.  Prescription drug management.        ED Course as of 01/11/25 0544   Sat Jan 11, 2025   0422 Labs grossly unremarkable.       Medications   sodium chloride 0.9 % bolus 1,000 mL (0 mL Intravenous Stopped 1/11/25 0456)    ondansetron (ZOFRAN) injection 4 mg (4 mg Intravenous Given 1/11/25 0353)   ketorolac (TORADOL) injection 15 mg (15 mg Intravenous Given 1/11/25 0353)       ED Risk Strat Scores                          SBIRT 20yo+      Flowsheet Row Most Recent Value   Initial Alcohol Screen: US AUDIT-C     1. How often do you have a drink containing alcohol? 0 Filed at: 01/11/2025 0401   2. How many drinks containing alcohol do you have on a typical day you are drinking?  0 Filed at: 01/11/2025 0401   3a. Male UNDER 65: How often do you have five or more drinks on one occasion? 0 Filed at: 01/11/2025 0401   3b. FEMALE Any Age, or MALE 65+: How often do you have 4 or more drinks on one occassion? 0 Filed at: 01/11/2025 0401   Audit-C Score 0 Filed at: 01/11/2025 0401   KELECHI: How many times in the past year have you...    Used an illegal drug or used a prescription medication for non-medical reasons? Never Filed at: 01/11/2025 0401                            History of Present Illness       Chief Complaint   Patient presents with    Vomiting     States of abd. Pain and vomiting since midnight.  Denies previous issues.       Past Medical History:   Diagnosis Date    Asthma     Scoliosis       History reviewed. No pertinent surgical history.   Family History   Problem Relation Age of Onset    No Known Problems Mother     No Known Problems Father       Social History     Tobacco Use    Smoking status: Former     Current packs/day: 0.20     Types: Cigarettes    Smokeless tobacco: Never    Tobacco comments:     cole 4/2020   Vaping Use    Vaping status: Never Used   Substance Use Topics    Alcohol use: No    Drug use: Not Currently     Types: Marijuana      E-Cigarette/Vaping    E-Cigarette Use Never User       E-Cigarette/Vaping Substances    Nicotine No     THC No     CBD No     Flavoring No       I have reviewed and agree with the history as documented.     24-year-old male presents to the emergency department for evaluation of  sudden onset diffuse abdominal pain, nausea and vomiting that woke him up from sleep.  He reports multiple episodes of nonbloody nonbilious emesis.  No associated constipation or diarrhea.  No fevers or chills.  No chest pain or shortness of breath.  No known sick contacts.  No prior abdominal surgeries or recent antibiotic use.  He denies any marijuana use.        Review of Systems   Constitutional:  Negative for chills and fever.   HENT:  Negative for ear pain and sore throat.    Eyes:  Negative for pain and visual disturbance.   Respiratory:  Negative for cough and shortness of breath.    Cardiovascular:  Negative for chest pain and palpitations.   Gastrointestinal:  Positive for abdominal pain, nausea and vomiting.   Genitourinary:  Negative for dysuria and hematuria.   Musculoskeletal:  Negative for arthralgias and back pain.   Skin:  Negative for color change and rash.   Neurological:  Negative for seizures and syncope.   All other systems reviewed and are negative.          Objective       ED Triage Vitals   Temperature Pulse Blood Pressure Respirations SpO2 Patient Position - Orthostatic VS   01/11/25 0338 01/11/25 0338 01/11/25 0338 01/11/25 0338 01/11/25 0338 01/11/25 0338   97.8 °F (36.6 °C) 68 117/59 18 96 % Lying      Temp Source Heart Rate Source BP Location FiO2 (%) Pain Score    01/11/25 0338 01/11/25 0430 01/11/25 0338 -- 01/11/25 0338    Temporal Monitor Right arm  10 - Worst Possible Pain      Vitals      Date and Time Temp Pulse SpO2 Resp BP Pain Score FACES Pain Rating User   01/11/25 0430 -- 70 96 % 17 112/74 -- -- KB   01/11/25 0353 -- -- -- -- -- 10 - Worst Possible Pain -- KB   01/11/25 0338 97.8 °F (36.6 °C) 68 96 % 18 117/59 10 - Worst Possible Pain -- ME            Physical Exam  Vitals and nursing note reviewed.   Constitutional:       General: He is not in acute distress.  HENT:      Head: Normocephalic and atraumatic.      Right Ear: External ear normal.      Left Ear: External ear  normal.      Nose: Nose normal.      Mouth/Throat:      Mouth: Mucous membranes are moist.   Eyes:      Extraocular Movements: Extraocular movements intact.      Conjunctiva/sclera: Conjunctivae normal.      Pupils: Pupils are equal, round, and reactive to light.   Cardiovascular:      Rate and Rhythm: Normal rate and regular rhythm.      Pulses: Normal pulses.      Heart sounds: Normal heart sounds. No murmur heard.  Pulmonary:      Effort: Pulmonary effort is normal. No respiratory distress.      Breath sounds: No stridor.   Abdominal:      General: Abdomen is flat. Bowel sounds are normal.      Tenderness: There is no abdominal tenderness. There is no guarding or rebound.   Musculoskeletal:         General: No deformity. Normal range of motion.      Cervical back: Normal range of motion and neck supple.   Skin:     General: Skin is warm and dry.      Capillary Refill: Capillary refill takes less than 2 seconds.   Neurological:      General: No focal deficit present.      Mental Status: He is alert and oriented to person, place, and time.   Psychiatric:         Mood and Affect: Mood normal.         Behavior: Behavior normal.         Results Reviewed       Procedure Component Value Units Date/Time    RAPID HIV 1/2 AB-AG COMBO for 12 years old and above [[id number]]  (Normal) Collected: 01/11/25 0453    Lab Status: Final result Specimen: Blood from Arm, Left Updated: 01/11/25 0534     Rapid HIV 1 AND 2 Non-Reactive     HIV-1 P24 Ag Screen Non-Reactive    Narrative:      Negative for HIV-1 p24 Antigen.  Negative for HIV-1 and/or HIV-2 Antibody.    Chlamydia/GC amplified DNA by PCR [[id number]] Collected: 01/11/25 0512    Lab Status: In process Specimen: Urine, Other Updated: 01/11/25 0516    Comprehensive metabolic panel [[id number]]  (Abnormal) Collected: 01/11/25 0352    Lab Status: Final result Specimen: Blood from Arm, Left Updated: 01/11/25 0422     Sodium 139 mmol/L      Potassium 4.0 mmol/L      Chloride 103  mmol/L      CO2 26 mmol/L      ANION GAP 10 mmol/L      BUN 17 mg/dL      Creatinine 1.12 mg/dL      Glucose 99 mg/dL      Calcium 9.8 mg/dL      AST 37 U/L      ALT 36 U/L      Alkaline Phosphatase 61 U/L      Total Protein 8.0 g/dL      Albumin 5.0 g/dL      Total Bilirubin 1.31 mg/dL      eGFR 90 ml/min/1.73sq m     Narrative:      National Kid[employer] Foundation guidelines for Chronic Kidney Disease (CKD):     Stage 1 with normal or high GFR (GFR > 90 mL/min/1.73 square meters)    Stage 2 Mild CKD (GFR = 60-89 mL/min/1.73 square meters)    Stage 3A Moderate CKD (GFR = 45-59 mL/min/1.73 square meters)    Stage 3B Moderate CKD (GFR = 30-44 mL/min/1.73 square meters)    Stage 4 Severe CKD (GFR = 15-29 mL/min/1.73 square meters)    Stage 5 End Stage CKD (GFR <15 mL/min/1.73 square meters)  Note: GFR calculation is accurate only with a steady state creatinine    Lipase [[id number]]  (Normal) Collected: 01/11/25 0352    Lab Status: Final result Specimen: Blood from Arm, Left Updated: 01/11/25 0422     Lipase 18 u/L     CBC and differential [[id number]]  (Abnormal) Collected: 01/11/25 0352    Lab Status: Final result Specimen: Blood from Arm, Left Updated: 01/11/25 0402     WBC 12.87 Thousand/uL      RBC 5.41 Million/uL      Hemoglobin 16.1 g/dL      Hematocrit 48.3 %      MCV 89 fL      MCH 29.8 pg      MCHC 33.3 g/dL      RDW 11.9 %      MPV 8.7 fL      Platelets 249 Thousands/uL      nRBC 0 /100 WBCs      Segmented % 89 %      Immature Grans % 0 %      Lymphocytes % 8 %      Monocytes % 3 %      Eosinophils Relative 0 %      Basophils Relative 0 %      Absolute Neutrophils 11.36 Thousands/µL      Absolute Immature Grans 0.03 Thousand/uL      Absolute Lymphocytes 0.99 Thousands/µL      Absolute Monocytes 0.43 Thousand/µL      Eosinophils Absolute 0.04 Thousand/µL      Basophils Absolute 0.02 Thousands/µL             No orders to display       Procedures    ED Medication and Procedure Management   Prior to Admission  Medications   Prescriptions Last Dose Informant Patient Reported? Taking?   albuterol (ProAir HFA) 90 mcg/act inhaler Not Taking  No No   Sig: Inhale 2 puffs every 4 (four) hours as needed for wheezing   Patient not taking: Reported on 1/11/2025   benzonatate (TESSALON PERLES) 100 mg capsule Not Taking  No No   Sig: Take 1 capsule (100 mg total) by mouth every 8 (eight) hours   Patient not taking: Reported on 1/11/2025   loratadine (CLARITIN) 10 mg tablet Not Taking  No No   Sig: Take 1 tablet (10 mg total) by mouth daily   Patient not taking: Reported on 1/11/2025   mupirocin (BACTROBAN) 2 % ointment Not Taking  No No   Sig: Apply topically 3 (three) times a day   Patient not taking: Reported on 1/11/2025      Facility-Administered Medications: None     Discharge Medication List as of 1/11/2025  4:32 AM        START taking these medications    Details   ondansetron (ZOFRAN-ODT) 4 mg disintegrating tablet Take 1 tablet (4 mg total) by mouth every 6 (six) hours as needed for nausea or vomiting, Starting Sat 1/11/2025, Normal           CONTINUE these medications which have NOT CHANGED    Details   albuterol (ProAir HFA) 90 mcg/act inhaler Inhale 2 puffs every 4 (four) hours as needed for wheezing, Starting Tue 2/27/2024, Normal      benzonatate (TESSALON PERLES) 100 mg capsule Take 1 capsule (100 mg total) by mouth every 8 (eight) hours, Starting Tue 2/27/2024, Normal      loratadine (CLARITIN) 10 mg tablet Take 1 tablet (10 mg total) by mouth daily, Starting Thu 5/25/2023, Normal      mupirocin (BACTROBAN) 2 % ointment Apply topically 3 (three) times a day, Starting Thu 5/25/2023, Normal           No discharge procedures on file.  ED SEPSIS DOCUMENTATION   Time reflects when diagnosis was documented in both MDM as applicable and the Disposition within this note       Time User Action Codes Description Comment    1/11/2025  4:22 AM Damien Almendarez [R11.2] Nausea and vomiting     1/11/2025  4:31 AM Damien Almendarez  [Z11.3] Screen for STD (sexually transmitted disease)                  Damien Almendarez MD  01/11/25 0544

## 2025-01-11 NOTE — DISCHARGE INSTRUCTIONS
Recommend Tylenol and ibuprofen as needed for pain  Take Zofran as needed for nausea and vomiting  Return for any worsening symptoms including worsening pain, uncontrollable vomiting, if you develop fevers, or any other concerning symptoms

## 2025-06-22 ENCOUNTER — HOSPITAL ENCOUNTER (EMERGENCY)
Facility: HOSPITAL | Age: 25
Discharge: HOME | End: 2025-06-22
Attending: EMERGENCY MEDICINE | Admitting: EMERGENCY MEDICINE

## 2025-06-22 ENCOUNTER — APPOINTMENT (EMERGENCY)
Dept: RADIOLOGY | Facility: HOSPITAL | Age: 25
End: 2025-06-22

## 2025-06-22 VITALS
OXYGEN SATURATION: 97 % | DIASTOLIC BLOOD PRESSURE: 62 MMHG | TEMPERATURE: 98.4 F | HEART RATE: 80 BPM | RESPIRATION RATE: 18 BRPM | BODY MASS INDEX: 17.46 KG/M2 | WEIGHT: 121.69 LBS | SYSTOLIC BLOOD PRESSURE: 108 MMHG

## 2025-06-22 DIAGNOSIS — R17 TOTAL BILIRUBIN, ELEVATED: ICD-10-CM

## 2025-06-22 DIAGNOSIS — R07.89 CHEST WALL PAIN: Primary | ICD-10-CM

## 2025-06-22 DIAGNOSIS — S29.011A INTERCOSTAL MUSCLE STRAIN: ICD-10-CM

## 2025-06-22 LAB
ALBUMIN SERPL BCG-MCNC: 4.7 G/DL (ref 3.5–5)
ALP SERPL-CCNC: 58 U/L (ref 34–104)
ALT SERPL W P-5'-P-CCNC: 23 U/L (ref 7–52)
ANION GAP SERPL CALCULATED.3IONS-SCNC: 6 MMOL/L (ref 4–13)
AST SERPL W P-5'-P-CCNC: 31 U/L (ref 13–39)
ATRIAL RATE: 64 BPM
BASOPHILS # BLD AUTO: 0.03 THOUSANDS/ÂΜL (ref 0–0.1)
BASOPHILS NFR BLD AUTO: 0 % (ref 0–1)
BILIRUB SERPL-MCNC: 1.97 MG/DL (ref 0.2–1)
BUN SERPL-MCNC: 17 MG/DL (ref 5–25)
CALCIUM SERPL-MCNC: 9.5 MG/DL (ref 8.4–10.2)
CARDIAC TROPONIN I PNL SERPL HS: <2 NG/L (ref ?–50)
CHLORIDE SERPL-SCNC: 105 MMOL/L (ref 96–108)
CO2 SERPL-SCNC: 28 MMOL/L (ref 21–32)
CREAT SERPL-MCNC: 1.24 MG/DL (ref 0.6–1.3)
EOSINOPHIL # BLD AUTO: 0.15 THOUSAND/ÂΜL (ref 0–0.61)
EOSINOPHIL NFR BLD AUTO: 2 % (ref 0–6)
ERYTHROCYTE [DISTWIDTH] IN BLOOD BY AUTOMATED COUNT: 12.6 % (ref 11.6–15.1)
GFR SERPL CREATININE-BSD FRML MDRD: 79 ML/MIN/1.73SQ M
GLUCOSE SERPL-MCNC: 92 MG/DL (ref 65–140)
HCT VFR BLD AUTO: 43.9 % (ref 36.5–49.3)
HGB BLD-MCNC: 14.8 G/DL (ref 12–17)
IMM GRANULOCYTES # BLD AUTO: 0.02 THOUSAND/UL (ref 0–0.2)
IMM GRANULOCYTES NFR BLD AUTO: 0 % (ref 0–2)
LYMPHOCYTES # BLD AUTO: 0.84 THOUSANDS/ÂΜL (ref 0.6–4.47)
LYMPHOCYTES NFR BLD AUTO: 11 % (ref 14–44)
MCH RBC QN AUTO: 30.1 PG (ref 26.8–34.3)
MCHC RBC AUTO-ENTMCNC: 33.7 G/DL (ref 31.4–37.4)
MCV RBC AUTO: 89 FL (ref 82–98)
MONOCYTES # BLD AUTO: 0.48 THOUSAND/ÂΜL (ref 0.17–1.22)
MONOCYTES NFR BLD AUTO: 7 % (ref 4–12)
NEUTROPHILS # BLD AUTO: 5.87 THOUSANDS/ÂΜL (ref 1.85–7.62)
NEUTS SEG NFR BLD AUTO: 80 % (ref 43–75)
NRBC BLD AUTO-RTO: 0 /100 WBCS
P AXIS: 52 DEGREES
PLATELET # BLD AUTO: 231 THOUSANDS/UL (ref 149–390)
PMV BLD AUTO: 8.4 FL (ref 8.9–12.7)
POTASSIUM SERPL-SCNC: 3.9 MMOL/L (ref 3.5–5.3)
PR INTERVAL: 140 MS
PROT SERPL-MCNC: 7.3 G/DL (ref 6.4–8.4)
QRS AXIS: 68 DEGREES
QRSD INTERVAL: 84 MS
QT INTERVAL: 374 MS
QTC INTERVAL: 386 MS
RBC # BLD AUTO: 4.91 MILLION/UL (ref 3.88–5.62)
SODIUM SERPL-SCNC: 139 MMOL/L (ref 135–147)
T WAVE AXIS: 43 DEGREES
VENTRICULAR RATE: 64 BPM
WBC # BLD AUTO: 7.39 THOUSAND/UL (ref 4.31–10.16)

## 2025-06-22 PROCEDURE — 96374 THER/PROPH/DIAG INJ IV PUSH: CPT

## 2025-06-22 PROCEDURE — 99285 EMERGENCY DEPT VISIT HI MDM: CPT | Performed by: PHYSICIAN ASSISTANT

## 2025-06-22 PROCEDURE — 71046 X-RAY EXAM CHEST 2 VIEWS: CPT

## 2025-06-22 PROCEDURE — 85025 COMPLETE CBC W/AUTO DIFF WBC: CPT | Performed by: PHYSICIAN ASSISTANT

## 2025-06-22 PROCEDURE — 36415 COLL VENOUS BLD VENIPUNCTURE: CPT | Performed by: PHYSICIAN ASSISTANT

## 2025-06-22 PROCEDURE — 93010 ELECTROCARDIOGRAM REPORT: CPT | Performed by: STUDENT IN AN ORGANIZED HEALTH CARE EDUCATION/TRAINING PROGRAM

## 2025-06-22 PROCEDURE — 93005 ELECTROCARDIOGRAM TRACING: CPT

## 2025-06-22 PROCEDURE — 80053 COMPREHEN METABOLIC PANEL: CPT | Performed by: PHYSICIAN ASSISTANT

## 2025-06-22 PROCEDURE — 84484 ASSAY OF TROPONIN QUANT: CPT | Performed by: PHYSICIAN ASSISTANT

## 2025-06-22 PROCEDURE — 99285 EMERGENCY DEPT VISIT HI MDM: CPT

## 2025-06-22 RX ORDER — KETOROLAC TROMETHAMINE 30 MG/ML
15 INJECTION, SOLUTION INTRAMUSCULAR; INTRAVENOUS ONCE
Status: COMPLETED | OUTPATIENT
Start: 2025-06-22 | End: 2025-06-22

## 2025-06-22 RX ORDER — IBUPROFEN 600 MG/1
600 TABLET, FILM COATED ORAL EVERY 6 HOURS PRN
Qty: 30 TABLET | Refills: 0 | Status: SHIPPED | OUTPATIENT
Start: 2025-06-22

## 2025-06-22 RX ADMIN — KETOROLAC TROMETHAMINE 15 MG: 30 INJECTION, SOLUTION INTRAMUSCULAR; INTRAVENOUS at 11:34

## 2025-06-22 NOTE — Clinical Note
Paulino Santos was seen and treated in our emergency department on 6/22/2025.    No restrictions            Diagnosis:     Paulino  may return to work on return date.    He may return on this date: 06/25/2025         If you have any questions or concerns, please don't hesitate to call.      Estefany Dennison PA-C    ______________________________           _______________          _______________  Hospital Representative                              Date                                Time

## 2025-06-22 NOTE — ED PROVIDER NOTES
Time reflects when diagnosis was documented in both MDM as applicable and the Disposition within this note       Time User Action Codes Description Comment    6/22/2025 12:12 PM Estefany Dennison Add [R07.89] Chest wall pain     6/22/2025 12:13 PM Estefany Dennison Add [S29.011A] Intercostal muscle strain     6/22/2025 12:13 PM Estefany Dennison Add [R17] Total bilirubin, elevated           ED Disposition       ED Disposition   Discharge    Condition   Stable    Date/Time   Sun Jun 22, 2025 12:12 PM    Comment   Paulino EMIL Santos discharge to home/self care.                   Assessment & Plan       Medical Decision Making  Differential diagnosis includes but not limited to: Musculoskeletal pain, intercostal strain, pneumothorax, pneumomediastinum.  ACS less likely.    Amount and/or Complexity of Data Reviewed  Labs: ordered.  Radiology: ordered and independent interpretation performed.    Risk  Prescription drug management.             Medications   ketorolac (TORADOL) injection 15 mg (15 mg Intravenous Given 6/22/25 1134)       ED Risk Strat Scores   HEART Risk Score      Flowsheet Row Most Recent Value   Heart Score Risk Calculator    History 0 Filed at: 06/22/2025 1212   ECG 0 Filed at: 06/22/2025 1212   Age 0 Filed at: 06/22/2025 1212   Risk Factors 1 Filed at: 06/22/2025 1212   Troponin 0 Filed at: 06/22/2025 1212   HEART Score 1 Filed at: 06/22/2025 1212          HEART Risk Score      Flowsheet Row Most Recent Value   Heart Score Risk Calculator    History 0 Filed at: 06/22/2025 1212   ECG 0 Filed at: 06/22/2025 1212   Age 0 Filed at: 06/22/2025 1212   Risk Factors 1 Filed at: 06/22/2025 1212   Troponin 0 Filed at: 06/22/2025 1212   HEART Score 1 Filed at: 06/22/2025 1212                      No data recorded    PERC Rule for PE      Flowsheet Row Most Recent Value   PERC Rule for PE    Age >=50 0 Filed at: 06/22/2025 1206   HR >=100 0 Filed at: 06/22/2025 1206   O2 Sat on room air < 95% 0 Filed at: 06/22/2025 1206    History of PE or DVT 0 Filed at: 06/22/2025 1206   Recent trauma or surgery 0 Filed at: 06/22/2025 1206   Hemoptysis 0 Filed at: 06/22/2025 1206   Exogenous estrogen 0 Filed at: 06/22/2025 1206   Unilateral leg swelling 0 Filed at: 06/22/2025 1206   PERC Rule for PE Results 0 Filed at: 06/22/2025 1206            SBIRT 20yo+      Flowsheet Row Most Recent Value   Initial Alcohol Screen: US AUDIT-C     1. How often do you have a drink containing alcohol? 0 Filed at: 06/22/2025 1118   2. How many drinks containing alcohol do you have on a typical day you are drinking?  0 Filed at: 06/22/2025 1118   3a. Male UNDER 65: How often do you have five or more drinks on one occasion? 0 Filed at: 06/22/2025 1118   Audit-C Score 0 Filed at: 06/22/2025 1118   KELECHI: How many times in the past year have you...    Used an illegal drug or used a prescription medication for non-medical reasons? Never Filed at: 06/22/2025 1118            Wells' Criteria for PE      Flowsheet Row Most Recent Value   Wells' Criteria for PE    Clinical signs and symptoms of DVT 0 Filed at: 06/22/2025 1206   PE is primary diagnosis or equally likely 0 Filed at: 06/22/2025 1206   HR >100 0 Filed at: 06/22/2025 1206   Immobilization at least 3 days or Surgery in the previous 4 weeks 0 Filed at: 06/22/2025 1206   Previous, objectively diagnosed PE or DVT 0 Filed at: 06/22/2025 1206   Hemoptysis 0 Filed at: 06/22/2025 1206   Malignancy with treatment within 6 months or palliative 0 Filed at: 06/22/2025 1206   Wells' Criteria Total 0 Filed at: 06/22/2025 1206                        History of Present Illness       Chief Complaint   Patient presents with    Chest Pain     Pain in left middle back radiates to chest causing tightness for a few days. Feels like it's stress. No meds PTA.        Past Medical History[1]   Past Surgical History[2]   Family History[3]   Social History[4]   E-Cigarette/Vaping    E-Cigarette Use Never User       E-Cigarette/Vaping  Substances    Nicotine No     THC No     CBD No     Flavoring No       I have reviewed and agree with the history as documented.     24-year-old male presents emergency department with complaints of chest pain.  States that he started with some centralized and left-sided chest pain 3 days ago during an argument with his brother.  States that the argument was fairly intense and almost passed physical.  Patient relays that he has had persistent symptoms since that time which are worse with certain movements or when taking deep breath.  Denies shortness of breath, diaphoresis, nausea, or vomiting.  No history of similar symptoms.  States that he tried Tylenol once for symptoms without alleviation.      History provided by:  Patient   used: No    Chest Pain  Pain location:  Substernal area and L chest  Pain quality comment:  Sharp  Radiates to: Throughout the entire chest.  Pain severity:  Moderate  Onset quality:  Sudden  Duration:  3 days  Timing:  Constant  Progression:  Waxing and waning  Chronicity:  New  Relieved by:  Nothing  Worsened by:  Deep breathing  Ineffective treatments: Tylenol.  Associated symptoms: no abdominal pain, no AICD problem, no altered mental status, no anorexia, no anxiety, no back pain, no claudication, no cough, no diaphoresis, no dizziness, no dysphagia, no fatigue, no fever, no headache, no heartburn, no lower extremity edema, no nausea, no near-syncope, no numbness, no orthopnea, no palpitations, no PND, no shortness of breath, no syncope, not vomiting and no weakness        Review of Systems   Constitutional:  Negative for activity change, appetite change, chills, diaphoresis, fatigue and fever.   HENT:  Negative for congestion, dental problem, drooling, ear discharge, ear pain, mouth sores, nosebleeds, rhinorrhea, sore throat and trouble swallowing.    Eyes:  Negative for pain, discharge and itching.   Respiratory:  Negative for cough, chest tightness, shortness of  breath and wheezing.    Cardiovascular:  Positive for chest pain. Negative for palpitations, orthopnea, claudication, leg swelling, syncope, PND and near-syncope.   Gastrointestinal:  Negative for abdominal pain, anorexia, blood in stool, constipation, diarrhea, heartburn, nausea and vomiting.   Endocrine: Negative for cold intolerance and heat intolerance.   Genitourinary:  Negative for difficulty urinating, dysuria, flank pain, frequency and urgency.   Musculoskeletal:  Negative for back pain.   Skin:  Negative for rash and wound.   Allergic/Immunologic: Negative for food allergies and immunocompromised state.   Neurological:  Negative for dizziness, seizures, syncope, weakness, numbness and headaches.   Psychiatric/Behavioral:  Negative for agitation, behavioral problems and confusion.            Objective       ED Triage Vitals   Temperature Pulse Blood Pressure Respirations SpO2 Patient Position - Orthostatic VS   06/22/25 1117 06/22/25 1117 06/22/25 1117 06/22/25 1117 06/22/25 1117 06/22/25 1117   98.4 °F (36.9 °C) 80 108/62 18 97 % Sitting      Temp Source Heart Rate Source BP Location FiO2 (%) Pain Score    06/22/25 1117 06/22/25 1117 06/22/25 1117 -- 06/22/25 1134    Oral Monitor Left arm  10 - Worst Possible Pain      Vitals      Date and Time Temp Pulse SpO2 Resp BP Pain Score FACES Pain Rating User   06/22/25 1134 -- -- -- -- -- 10 - Worst Possible Pain -- AS   06/22/25 1117 98.4 °F (36.9 °C) 80 97 % 18 108/62 -- -- DE            Physical Exam  Vitals and nursing note reviewed.   Constitutional:       General: He is not in acute distress.     Appearance: He is not diaphoretic.   HENT:      Head: Normocephalic and atraumatic.      Right Ear: External ear normal.      Left Ear: External ear normal.      Mouth/Throat:      Pharynx: No oropharyngeal exudate.     Eyes:      Extraocular Movements: Extraocular movements intact.      Conjunctiva/sclera: Conjunctivae normal.     Neck:      Vascular: No JVD.       Trachea: No tracheal deviation.     Cardiovascular:      Rate and Rhythm: Normal rate and regular rhythm.      Heart sounds: Normal heart sounds. No murmur heard.     No friction rub. No gallop.      Comments: No calf pain  Pulmonary:      Effort: Pulmonary effort is normal. No respiratory distress.      Breath sounds: No decreased breath sounds, wheezing or rales.   Chest:      Chest wall: Tenderness present.     Abdominal:      General: Bowel sounds are normal. There is no distension.      Palpations: Abdomen is soft.      Tenderness: There is no abdominal tenderness. There is no guarding.     Musculoskeletal:         General: No tenderness or deformity. Normal range of motion.      Right lower leg: No edema.      Left lower leg: No edema.   Lymphadenopathy:      Cervical: No cervical adenopathy.     Skin:     General: Skin is warm and dry.      Findings: No erythema or rash.     Neurological:      General: No focal deficit present.      Mental Status: He is alert and oriented to person, place, and time.     Psychiatric:         Mood and Affect: Mood normal.         Behavior: Behavior normal.         Results Reviewed       Procedure Component Value Units Date/Time    HS Troponin 0hr (reflex protocol) [[id number]]  (Normal) Collected: 06/22/25 1131    Lab Status: Final result Specimen: Blood from Arm, Right Updated: 06/22/25 1203     hs TnI 0hr <2 ng/L     HS Troponin I 4hr [[id number]]     Lab Status: No result Specimen: Blood     HS Troponin I 2hr [[id number]]     Lab Status: No result Specimen: Blood     Comprehensive metabolic panel [[id number]]  (Abnormal) Collected: 06/22/25 1131    Lab Status: Final result Specimen: Blood from Arm, Right Updated: 06/22/25 1156     Sodium 139 mmol/L      Potassium 3.9 mmol/L      Chloride 105 mmol/L      CO2 28 mmol/L      ANION GAP 6 mmol/L      BUN 17 mg/dL      Creatinine 1.24 mg/dL      Glucose 92 mg/dL      Calcium 9.5 mg/dL      AST 31 U/L      ALT 23 U/L      Alkaline  Phosphatase 58 U/L      Total Protein 7.3 g/dL      Albumin 4.7 g/dL      Total Bilirubin 1.97 mg/dL      eGFR 79 ml/min/1.73sq m     Narrative:      National Kid[employer] Foundation guidelines for Chronic Kidney Disease (CKD):     Stage 1 with normal or high GFR (GFR > 90 mL/min/1.73 square meters)    Stage 2 Mild CKD (GFR = 60-89 mL/min/1.73 square meters)    Stage 3A Moderate CKD (GFR = 45-59 mL/min/1.73 square meters)    Stage 3B Moderate CKD (GFR = 30-44 mL/min/1.73 square meters)    Stage 4 Severe CKD (GFR = 15-29 mL/min/1.73 square meters)    Stage 5 End Stage CKD (GFR <15 mL/min/1.73 square meters)  Note: GFR calculation is accurate only with a steady state creatinine    CBC and differential [[id number]]  (Abnormal) Collected: 06/22/25 1131    Lab Status: Final result Specimen: Blood from Arm, Right Updated: 06/22/25 1138     WBC 7.39 Thousand/uL      RBC 4.91 Million/uL      Hemoglobin 14.8 g/dL      Hematocrit 43.9 %      MCV 89 fL      MCH 30.1 pg      MCHC 33.7 g/dL      RDW 12.6 %      MPV 8.4 fL      Platelets 231 Thousands/uL      nRBC 0 /100 WBCs      Segmented % 80 %      Immature Grans % 0 %      Lymphocytes % 11 %      Monocytes % 7 %      Eosinophils Relative 2 %      Basophils Relative 0 %      Absolute Neutrophils 5.87 Thousands/µL      Absolute Immature Grans 0.02 Thousand/uL      Absolute Lymphocytes 0.84 Thousands/µL      Absolute Monocytes 0.48 Thousand/µL      Eosinophils Absolute 0.15 Thousand/µL      Basophils Absolute 0.03 Thousands/µL             XR chest 2 views   ED Interpretation by Estefany Dennison PA-C (06/22 1205)   No focal consolidation.  No pneumothorax.  No pneumomediastinum.           ECG 12 Lead Documentation Only    Date/Time: 6/22/2025 12:05 PM    Performed by: Estefany Dennison PA-C  Authorized by: Estefany Dennison PA-C    Indications / Diagnosis:  Pain  ECG reviewed by me, the ED Provider: yes    Patient location:  ED  Previous ECG:     Previous ECG:   Unavailable  Interpretation:     Interpretation: normal    Rate:     ECG rate:  64    ECG rate assessment: normal    Rhythm:     Rhythm: sinus rhythm    Ectopy:     Ectopy: none    QRS:     QRS axis:  Normal    QRS intervals:  Normal  Conduction:     Conduction: normal    ST segments:     ST segments:  Normal  T waves:     T waves: normal        ED Medication and Procedure Management   Prior to Admission Medications   Prescriptions Last Dose Informant Patient Reported? Taking?   albuterol (ProAir HFA) 90 mcg/act inhaler   No No   Sig: Inhale 2 puffs every 4 (four) hours as needed for wheezing   Patient not taking: Reported on 1/11/2025   benzonatate (TESSALON PERLES) 100 mg capsule   No No   Sig: Take 1 capsule (100 mg total) by mouth every 8 (eight) hours   Patient not taking: Reported on 1/11/2025   loratadine (CLARITIN) 10 mg tablet   No No   Sig: Take 1 tablet (10 mg total) by mouth daily   Patient not taking: Reported on 1/11/2025   mupirocin (BACTROBAN) 2 % ointment   No No   Sig: Apply topically 3 (three) times a day   Patient not taking: Reported on 1/11/2025   ondansetron (ZOFRAN-ODT) 4 mg disintegrating tablet   No No   Sig: Take 1 tablet (4 mg total) by mouth every 6 (six) hours as needed for nausea or vomiting      Facility-Administered Medications: None     Patient's Medications   Discharge Prescriptions    IBUPROFEN (MOTRIN) 600 MG TABLET    Take 1 tablet (600 mg total) by mouth every 6 (six) hours as needed for mild pain       Start Date: 6/22/2025 End Date: --       Order Dose: 600 mg       Quantity: 30 tablet    Refills: 0     No discharge procedures on file.  ED SEPSIS DOCUMENTATION   Time reflects when diagnosis was documented in both MDM as applicable and the Disposition within this note       Time User Action Codes Description Comment    6/22/2025 12:12 PM Estefany Dennison Add [R07.89] Chest wall pain     6/22/2025 12:13 PM Estefany Dennison Add [S29.011A] Intercostal muscle strain     6/22/2025  12:13 PM Estefany Dennison Add [R17] Total bilirubin, elevated                      [1]   Past Medical History:  Diagnosis Date    Asthma     Scoliosis    [2] No past surgical history on file.  [3]   Family History  Problem Relation Name Age of Onset    No Known Problems Mother      No Known Problems Father     [4]   Social History  Tobacco Use    Smoking status: Former     Current packs/day: 0.20     Types: Cigarettes    Smokeless tobacco: Never    Tobacco comments:     qiut 4/2020   Vaping Use    Vaping status: Never Used   Substance Use Topics    Alcohol use: No    Drug use: Not Currently     Types: Marijuana        Estefany Dennison PA-C  06/22/25 1215

## 2025-06-22 NOTE — DISCHARGE INSTRUCTIONS
Your total bilirubin was elevated on your lab work.  Please follow-up with your family doctor for repeat lab draw and possible referral to gastroenterology.

## 2025-06-23 ENCOUNTER — OFFICE VISIT (OUTPATIENT)
Dept: FAMILY MEDICINE CLINIC | Facility: CLINIC | Age: 25
End: 2025-06-23

## 2025-06-23 VITALS
OXYGEN SATURATION: 99 % | SYSTOLIC BLOOD PRESSURE: 118 MMHG | TEMPERATURE: 97.3 F | BODY MASS INDEX: 17.9 KG/M2 | WEIGHT: 125 LBS | HEIGHT: 70 IN | DIASTOLIC BLOOD PRESSURE: 74 MMHG | HEART RATE: 56 BPM

## 2025-06-23 DIAGNOSIS — R10.13 EPIGASTRIC ABDOMINAL PAIN: Primary | ICD-10-CM

## 2025-06-23 PROCEDURE — 99213 OFFICE O/P EST LOW 20 MIN: CPT | Performed by: FAMILY MEDICINE

## 2025-06-23 NOTE — PROGRESS NOTES
Name: Paulino Santos      : 2000      MRN: [id number]  Encounter Provider: Norma Abel DO  Encounter Date: 2025   Encounter department: [facility]  :  Assessment & Plan  Epigastric abdominal pain    Patient with improvement in chest wall pain however worsening of epigastric abdominal pain.  Suspect associated GERD.  Recommendation for treatment with 40 mg of Prilosec.  May take 2 over-the-counter tablets.  Recommend discontinuation of ibuprofen as recommended by the emergency department.  May continue with Tylenol as needed for chest wall pain.  Recommend follow-up in 1 to 2 months or sooner as needed should symptoms persist or worsen with consideration for annual physical exam at that time.               Depression Screening and Follow-up Plan: Patient was screened for depression during today's encounter. They screened negative with a PHQ-2 score of 0.        History of Present Illness   Paulino is a 24-year-old male with past medical history of allergic rhinitis, asthma, scoliosis who presents today for appointment to establish care.    Patient is also being seen for emergency department follow-up.  He was seen there on 2025 and diagnosed with chest wall pain.  He was provided a prescription for ibuprofen for treatment of this.    Patient notes that this chest pain started on Thursday night.  Notes that initially his stomach was hurting so bad that his chest hurt to breathe.  Notes improvement in symptoms with treatment in the emergency department however his stomach is still killing him.  He does note associated nausea and decreased appetite.  He denies sensation of acid reflux.      Review of Systems   Constitutional:  Positive for appetite change. Negative for fatigue and fever.   HENT:  Negative for congestion and sore throat.    Respiratory:  Negative for cough and shortness of breath.    Cardiovascular:  Negative for chest pain and palpitations.   Gastrointestinal:  " Positive for abdominal pain and nausea. Negative for blood in stool, constipation, diarrhea and vomiting.   Genitourinary:  Negative for dysuria and hematuria.   Musculoskeletal:  Negative for arthralgias and myalgias.   Skin:  Negative for rash.   Neurological:  Negative for dizziness and headaches.   Psychiatric/Behavioral:  Negative for dysphoric mood. The patient is not nervous/anxious.        Objective   /74 (BP Location: Left arm, Patient Position: Sitting, Cuff Size: Standard)   Pulse 56   Temp (!) 97.3 °F (36.3 °C) (Tympanic)   Ht 5' 10\" (1.778 m)   Wt 56.7 kg (125 lb)   SpO2 99%   BMI 17.94 kg/m²      Physical Exam  Vitals reviewed.   Constitutional:       General: He is not in acute distress.     Appearance: Normal appearance.   HENT:      Head: Normocephalic and atraumatic.      Right Ear: Tympanic membrane, ear canal and external ear normal.      Left Ear: Tympanic membrane, ear canal and external ear normal.      Mouth/Throat:      Mouth: Mucous membranes are moist.      Pharynx: Oropharynx is clear.     Eyes:      General: No scleral icterus.        Right eye: No discharge.         Left eye: No discharge.      Conjunctiva/sclera: Conjunctivae normal.      Pupils: Pupils are equal, round, and reactive to light.       Cardiovascular:      Rate and Rhythm: Normal rate and regular rhythm.      Heart sounds: No murmur heard.     No friction rub. No gallop.      Comments: Tenderness with palpation of the left anterior chest wall.    Pulmonary:      Effort: Pulmonary effort is normal.      Breath sounds: No wheezing, rhonchi or rales.   Abdominal:      General: Bowel sounds are normal.      Palpations: Abdomen is soft.      Tenderness: There is abdominal tenderness. There is no guarding or rebound.      Comments: Epigastric     Musculoskeletal:      Right lower leg: No edema.      Left lower leg: No edema.     Skin:     General: Skin is warm and dry.     Neurological:      General: No focal " deficit present.      Mental Status: He is alert and oriented to person, place, and time.     Psychiatric:         Mood and Affect: Mood normal.         Behavior: Behavior normal.          Patient on 1:1

## 2025-06-25 PROBLEM — R10.13 EPIGASTRIC ABDOMINAL PAIN: Status: ACTIVE | Noted: 2025-06-25

## 2025-06-25 NOTE — ASSESSMENT & PLAN NOTE
Patient with improvement in chest wall pain however worsening of epigastric abdominal pain.  Suspect associated GERD.  Recommendation for treatment with 40 mg of Prilosec.  May take 2 over-the-counter tablets.  Recommend discontinuation of ibuprofen as recommended by the emergency department.  May continue with Tylenol as needed for chest wall pain.  Recommend follow-up in 1 to 2 months or sooner as needed should symptoms persist or worsen with consideration for annual physical exam at that time.

## 2025-08-06 ENCOUNTER — OFFICE VISIT (OUTPATIENT)
Dept: FAMILY MEDICINE CLINIC | Facility: CLINIC | Age: 25
End: 2025-08-06
Payer: COMMERCIAL

## 2025-08-06 VITALS
BODY MASS INDEX: 18.09 KG/M2 | WEIGHT: 126.4 LBS | DIASTOLIC BLOOD PRESSURE: 64 MMHG | SYSTOLIC BLOOD PRESSURE: 82 MMHG | HEART RATE: 51 BPM | HEIGHT: 70 IN | OXYGEN SATURATION: 99 % | TEMPERATURE: 97.1 F

## 2025-08-06 DIAGNOSIS — Z02.4 DRIVER'S PERMIT PE (PHYSICAL EXAMINATION): ICD-10-CM

## 2025-08-06 DIAGNOSIS — Z00.00 WELL ADULT EXAM: Primary | ICD-10-CM

## 2025-08-06 PROCEDURE — 99395 PREV VISIT EST AGE 18-39: CPT | Performed by: FAMILY MEDICINE
